# Patient Record
Sex: FEMALE | Race: WHITE | NOT HISPANIC OR LATINO | Employment: FULL TIME | URBAN - NONMETROPOLITAN AREA
[De-identification: names, ages, dates, MRNs, and addresses within clinical notes are randomized per-mention and may not be internally consistent; named-entity substitution may affect disease eponyms.]

---

## 2019-11-19 ENCOUNTER — OFFICE VISIT CONVERTED (OUTPATIENT)
Dept: FAMILY MEDICINE CLINIC | Age: 34
End: 2019-11-19
Attending: FAMILY MEDICINE

## 2020-11-13 ENCOUNTER — HOSPITAL ENCOUNTER (OUTPATIENT)
Dept: OTHER | Facility: HOSPITAL | Age: 35
Discharge: HOME | End: 2020-11-13
Attending: NURSE PRACTITIONER

## 2020-11-13 LAB
ALBUMIN SERPL-MCNC: 4.4 G/DL (ref 3.5–5)
ALBUMIN/GLOB SERPL: 1.8 {RATIO} (ref 1.4–2.6)
ALP SERPL-CCNC: 47 U/L (ref 42–98)
ALT SERPL-CCNC: 11 U/L (ref 10–40)
ANION GAP SERPL CALC-SCNC: 15 MMOL/L (ref 8–19)
AST SERPL-CCNC: 19 U/L (ref 15–50)
BASOPHILS # BLD MANUAL: 0.03 10*3/UL (ref 0–0.2)
BASOPHILS NFR BLD MANUAL: 0.4 % (ref 0–3)
BILIRUB SERPL-MCNC: 0.42 MG/DL (ref 0.2–1.3)
BUN SERPL-MCNC: 8 MG/DL (ref 5–25)
BUN/CREAT SERPL: 9 {RATIO} (ref 6–20)
CALCIUM SERPL-MCNC: 9.5 MG/DL (ref 8.7–10.4)
CHLORIDE SERPL-SCNC: 103 MMOL/L (ref 99–111)
CHOLEST SERPL-MCNC: 203 MG/DL (ref 107–200)
CHOLEST/HDLC SERPL: 4 {RATIO} (ref 3–6)
CONV CO2: 24 MMOL/L (ref 22–32)
CONV TOTAL PROTEIN: 6.8 G/DL (ref 6.3–8.2)
CREAT UR-MCNC: 0.91 MG/DL (ref 0.5–0.9)
DEPRECATED RDW RBC AUTO: 42.5 FL
EOSINOPHIL # BLD MANUAL: 0.12 10*3/UL (ref 0–0.7)
EOSINOPHIL NFR BLD MANUAL: 1.8 % (ref 0–7)
ERYTHROCYTE [DISTWIDTH] IN BLOOD BY AUTOMATED COUNT: 11.7 % (ref 11.5–14.5)
GFR SERPLBLD BASED ON 1.73 SQ M-ARVRAT: >60 ML/MIN/{1.73_M2}
GLOBULIN UR ELPH-MCNC: 2.4 G/DL (ref 2–3.5)
GLUCOSE SERPL-MCNC: 88 MG/DL (ref 65–99)
GRANS (ABSOLUTE): 3.77 10*3/UL (ref 2–8)
GRANS: 56.2 % (ref 30–85)
HBA1C MFR BLD: 14.2 G/DL (ref 12–16)
HCT VFR BLD AUTO: 41 % (ref 37–47)
HDLC SERPL-MCNC: 51 MG/DL (ref 40–60)
IMM GRANULOCYTES # BLD: 0.01 10*3/UL (ref 0–0.54)
IMM GRANULOCYTES NFR BLD: 0.1 % (ref 0–0.43)
LDLC SERPL CALC-MCNC: 116 MG/DL (ref 70–100)
LYMPHOCYTES # BLD MANUAL: 2.5 10*3/UL (ref 1–5)
LYMPHOCYTES NFR BLD MANUAL: 4.3 % (ref 3–10)
MCH RBC QN AUTO: 33.7 PG (ref 27–31)
MCHC RBC AUTO-ENTMCNC: 34.6 G/DL (ref 33–37)
MCV RBC AUTO: 97.4 FL (ref 81–99)
MONOCYTES # BLD AUTO: 0.29 10*3/UL (ref 0.2–1.2)
OSMOLALITY SERPL CALC.SUM OF ELEC: 284 MOSM/KG (ref 273–304)
PLATELET # BLD AUTO: 230 10*3/UL (ref 130–400)
PMV BLD AUTO: 10.1 FL (ref 7.4–10.4)
POTASSIUM SERPL-SCNC: 3.9 MMOL/L (ref 3.5–5.3)
RBC # BLD AUTO: 4.21 10*6/UL (ref 4.2–5.4)
SODIUM SERPL-SCNC: 138 MMOL/L (ref 135–147)
T4 FREE SERPL-MCNC: 0.9 NG/DL (ref 0.9–1.8)
TRIGL SERPL-MCNC: 181 MG/DL (ref 40–150)
TSH SERPL-ACNC: 5.06 M[IU]/L (ref 0.27–4.2)
VARIANT LYMPHS NFR BLD MANUAL: 37.2 % (ref 20–45)
VLDLC SERPL-MCNC: 36 MG/DL (ref 5–37)
WBC # BLD AUTO: 6.72 10*3/UL (ref 4.8–10.8)

## 2020-11-20 ENCOUNTER — OFFICE VISIT CONVERTED (OUTPATIENT)
Dept: FAMILY MEDICINE CLINIC | Age: 35
End: 2020-11-20
Attending: NURSE PRACTITIONER

## 2020-11-25 ENCOUNTER — HOSPITAL ENCOUNTER (OUTPATIENT)
Dept: OTHER | Facility: HOSPITAL | Age: 35
Discharge: HOME | End: 2020-11-25
Attending: NURSE PRACTITIONER

## 2020-11-25 ENCOUNTER — OFFICE VISIT CONVERTED (OUTPATIENT)
Dept: FAMILY MEDICINE CLINIC | Age: 35
End: 2020-11-25
Attending: NURSE PRACTITIONER

## 2020-11-27 LAB — BACTERIA UR CULT: NORMAL

## 2020-12-07 ENCOUNTER — HOSPITAL ENCOUNTER (OUTPATIENT)
Dept: OTHER | Facility: HOSPITAL | Age: 35
Discharge: HOME | End: 2020-12-07
Attending: NURSE PRACTITIONER

## 2020-12-29 ENCOUNTER — HOSPITAL ENCOUNTER (OUTPATIENT)
Dept: OTHER | Facility: HOSPITAL | Age: 35
Discharge: HOME | End: 2020-12-29
Attending: NURSE PRACTITIONER

## 2020-12-29 LAB
APPEARANCE UR: CLEAR
BACTERIA UR QL AUTO: ABNORMAL
BILIRUB UR QL: NEGATIVE
CASTS URNS QL MICRO: ABNORMAL /[LPF]
COLOR UR: YELLOW
CONV LEUKOCYTE ESTERASE: NEGATIVE
CONV UROBILINOGEN IN URINE BY AUTOMATED TEST STRIP: 0.2 {EHRLICHU}/DL (ref 0.1–1)
EPI CELLS #/AREA URNS HPF: ABNORMAL /[HPF]
GLUCOSE 24H UR-MCNC: NEGATIVE MG/DL
HGB UR QL STRIP: NEGATIVE
KETONES UR QL STRIP: NEGATIVE MG/DL
MUCOUS THREADS URNS QL MICRO: ABNORMAL
NITRITE UR-MCNC: NEGATIVE MG/ML
PH UR STRIP.AUTO: 7 [PH] (ref 5–8)
PROT UR-MCNC: NEGATIVE MG/DL
RBC # BLD AUTO: ABNORMAL /[HPF]
SP GR UR STRIP: 1.02 (ref 1–1.03)
SPECIMEN SOURCE: ABNORMAL
TSH SERPL-ACNC: 3 M[IU]/L (ref 0.27–4.2)
UNIDENT CRYS URNS QL MICRO: ABNORMAL /[HPF]
WBC #/AREA URNS HPF: ABNORMAL /[HPF]

## 2020-12-31 LAB — BACTERIA UR CULT: NORMAL

## 2021-01-06 ENCOUNTER — HOSPITAL ENCOUNTER (OUTPATIENT)
Dept: OTHER | Facility: HOSPITAL | Age: 36
Discharge: HOME | End: 2021-01-06
Attending: FAMILY MEDICINE

## 2021-01-07 LAB — SARS-COV-2 RNA SPEC QL NAA+PROBE: DETECTED

## 2021-05-18 NOTE — PROGRESS NOTES
Jolene Gonzales  1985     Office/Outpatient Visit    Visit Date: Tue, Nov 19, 2019 11:20 am    Provider: Ginette Solano MD (Assistant: Tatiana Davis MA)    Location: [facility]        Electronically signed by Ginette Solano MD on  11/19/2019 12:22:51 PM                             Subjective:        CC: Ms. Gonzales is a 34 year old female.  Patient presents today for new patient establishment;         HPI: Jolene is here today to establish care with me and for her annual physical.  She is quite healthy.        She is UTD on pap smear, last done 3/2019 and this was normal.  Five year repeat recommended.  No h/o abnormal pap smear.  She is UTD on Tdap (3 years ago with her last pregnancy), Havrix 1&2 (2018) and flu (11/2019).          She is on omeprazole for GERD.  No indigestion or reflux as long as she is on the medication.        She takes a PNV daily.        She has a h/o migraines for which she has been on Fioricet in the past.  She would like to get a refill on that or something similar.          PHQ-9 Depression Screening: Completed form scanned and in chart; Total Score 0     ROS:     CONSTITUTIONAL:  Negative for fatigue and fever.      EYES:  Negative for blurred vision.      E/N/T:  Negative for diminished hearing and nasal congestion.      CARDIOVASCULAR:  Negative for chest pain and palpitations.      RESPIRATORY:  Negative for recent cough and dyspnea.      GASTROINTESTINAL:  Negative for abdominal pain, constipation, diarrhea, nausea and vomiting.      GENITOURINARY:  Negative for dysuria and urinary incontinence.      MUSCULOSKELETAL:  Negative for arthralgias and myalgias.      INTEGUMENTARY/BREAST:  Positive for rash psoriasis (follows with Dr. Hua).   Negative for atypical mole(s).      NEUROLOGICAL:  Positive for headaches ( migraine ).   Negative for paresthesias or weakness.      PSYCHIATRIC:  Negative for anxiety, depression and sleep disturbance.          Past Medical  History / Family History / Social History:         Last Reviewed on 2019 12:22 PM by Ginette Solano    Past Medical History:                 PAST MEDICAL HISTORY         Gastroesophageal Reflux Disease     Migraine Headaches     Psoriasis         Surgical History:         : X 2;         Family History:         Sister(s): Endocrine Hypothyroidism     Maternal Grandfather: Myocardial Infarction (  age 55 )         Social History: sister-in-law Balaji Crockett     Occupation: [employer];     Marital Status:  Shahid     Children: 2 children         Tobacco/Alcohol/Supplements:     Last Reviewed on 2019 12:22 PM by Ginette Solano    Tobacco: She has never smoked.          Substance Abuse History:     Last Reviewed on 2019 12:22 PM by Ginette Solano        Mental Health History:     Last Reviewed on 2019 12:22 PM by Ginette Solano        Communicable Diseases (eg STDs):     Last Reviewed on 2019 12:22 PM by Ginette Solano        Current Problems:     None Recorded        Immunizations:     None        Current Medications:     None        Objective:        Vitals:         Current: 2019 11:33:25 AM    Ht:  5 ft, 6 in;  Wt: 195 lbs;  BMI: 31.5T: 98.3 F (oral);  BP: 121/87 mm Hg (left arm, sitting);  P: 86 bpm (left arm (BP Cuff), sitting)        Exams:     PHYSICAL EXAM:     GENERAL: vital signs recorded - well developed, well nourished;  well groomed;  no apparent distress;     EYES: extraocular movements intact; conjunctiva and cornea are normal; PERRLA;     E/N/T: OROPHARYNX:  normal mucosa, dentition, gingiva, and posterior pharynx;     NECK: range of motion is normal; thyroid exam reveals not enlarged;     RESPIRATORY: normal respiratory rate and pattern with no distress; normal breath sounds with no rales, rhonchi, wheezes or rubs;     CARDIOVASCULAR: normal rate; rhythm is regular;  no systolic murmur; no edema;     GASTROINTESTINAL: nontender; normal  bowel sounds;     LYMPHATIC: no enlargement of cervical or facial nodes;     MUSCULOSKELETAL: normal gait; normal overall tone     NEUROLOGIC: mental status: alert and oriented x 3; Reflexes: brachioradialis: 2+; knee jerks: 2+;     PSYCHIATRIC: appropriate affect and demeanor; normal psychomotor function; normal speech pattern;         Assessment:         Z00.00   Encounter for general adult medical examination without abnormal findings       K21.9   Gastro-esophageal reflux disease without esophagitis       Z13.31   Encounter for screening for depression       G43.009   Migraine without aura, not intractable, without status migrainosus       L40.0   Psoriasis vulgaris           ORDERS:         Meds Prescribed:       [New Rx] Imitrex 50 mg oral tablet [take 1 tablet (50 mg) by oral route after onset of migraine; may repeat after 2 hours if headache returns, not to exceed 2 in 24hrs], #27 (twenty seven) tablets, Refills: 1 (one)       [Refilled] Prenatal 28 mg iron- 800 mcg oral tablet [take one tablet by mouth daily], #90 (ninety) tablets, Refills: 3 (three)       [Refilled] omeprazole 40 mg oral capsule,delayed release (enteric coated) [take 1 capsule (40 mg) by oral route once daily before a meal], #90 (ninety) capsules, Refills: 3 (three)       [New Rx] triamcinolone acetonide 0.1 % Topical Cream [apply a thin layer to the affected area(s) by topical route 2 times per day], #30 (thirty) grams, Refills: 0 (zero)         Lab Orders:       APPTO  Appointment need  (In-House)              Other Orders:         Depression screen negative  (In-House)                      Plan:         Encounter for general adult medical examination without abnormal findingsShe is UTD on pap smear, last done 3/2019 and this was normal.  Five year repeat recommended.  No h/o abnormal pap smear.  She is UTD on Tdap (3 years ago with her last pregnancy), Havrix 1&2 (2018) and flu (11/2019).  RTC 1 yr.    MIPS PHQ-9 Depression  Screening: Completed form scanned and in chart; Total Score 0; Negative Depression Screen     FOLLOW-UP: in 1 year:.  annual physical 30 min with Russ          Orders:       APPTO  Appointment need  (In-House)              Depression screen negative  (In-House)              Gastro-esophageal reflux disease without esophagitisStable as long as she is on the medication.  We did discuss the possible risks of long-term use of the medication.          Prescriptions:       [Refilled] omeprazole 40 mg oral capsule,delayed release (enteric coated) [take 1 capsule (40 mg) by oral route once daily before a meal], #90 (ninety) capsules, Refills: 3 (three)         Migraine without aura, not intractable, without status migrainosusHas been on Fioricet in the past.  Will try her on Imitrex instead.          Prescriptions:       [New Rx] Imitrex 50 mg oral tablet [take 1 tablet (50 mg) by oral route after onset of migraine; may repeat after 2 hours if headache returns, not to exceed 2 in 24hrs], #27 (twenty seven) tablets, Refills: 1 (one)         Psoriasis vulgarisFollows with Dr. Hua but does not go back there until 2020.  Sending in some triamcinolone to use on her back.  She does sometimes get outbreaks on her face but knows not to use it there.          Prescriptions:       [New Rx] triamcinolone acetonide 0.1 % Topical Cream [apply a thin layer to the affected area(s) by topical route 2 times per day], #30 (thirty) grams, Refills: 0 (zero)             Other Prescriptions:       [Refilled] Prenatal 28 mg iron- 800 mcg oral tablet [take one tablet by mouth daily], #90 (ninety) tablets, Refills: 3 (three)         Patient Recommendations:        For  Encounter for general adult medical examination without abnormal findings:                    APPOINTMENT INFORMATION:        Monday Tuesday Wednesday Thursday Friday Saturday Sunday            Time:___________________AM  PM   Date:_____________________              Charge Capture:         Primary Diagnosis:     Z00.00  Encounter for general adult medical examination without abnormal findings           Orders:      [id number]  Preventive medicine, new patient, age 18-39 years  (In-House)            APPTO  Appointment need  (In-House)              Depression screen negative  (In-House)              K21.9  Gastro-esophageal reflux disease without esophagitis     Z13.31  Encounter for screening for depression     G43.009  Migraine without aura, not intractable, without status migrainosus     L40.0  Psoriasis vulgaris

## 2021-05-18 NOTE — PROGRESS NOTES
Jolene Gonzales  1985     Office/Outpatient Visit    Visit Date:  02:59 pm    Provider: Ave Harris N.P. (Assistant: Neida Sandoval MA)    Location: [facility]        Electronically signed by Ave Harris N.P. on  2020 03:25:12 PM                             Subjective:        CC: VIDEO [phone] DONE today Ms. Gonzales is a 35 year old White female.  Burning and sharp sensations while urinating. Started 2020;         HPI:           Ms. Gonzales presents with dysuria.  This began within the last 2 days.  Associated symptoms include urgency and urinary frequency.  She denies associated abdominal pain, fever, flank pain, hematuria or vomiting.  Treatments tried thus far include Increasing fluid intake.  Treatment effectivenss has been generally ineffective.      ROS:     CONSTITUTIONAL:  Negative for chills, fatigue and fever.      GASTROINTESTINAL:  Negative for abdominal pain, nausea and vomiting.      GENITOURINARY:  Positive for dysuria and frequent urination.   Negative for hematuria.      MUSCULOSKELETAL:  Negative for back pain.          Past Medical History / Family History / Social History:         Last Reviewed on 2020 09:28 AM by Ave Harris    Past Medical History:                 PAST MEDICAL HISTORY         Gastroesophageal Reflux Disease     Migraine Headaches     Psoriasis         PAST MEDICAL HISTORY             CURRENT MEDICAL PROVIDERS:    Dermatologist: [facility]         PREVENTIVE HEALTH MAINTENANCE             DENTAL CLEANING: was last done  - [place]     EYE EXAM: was last done      PAP SMEAR: was last done  with normal results         Surgical History:         : X 2;         Family History:     Father: Vitamin D deficiency     Mother: Hypertension;  Hypothyroidism     Sister(s): Endocrine Hypothyroidism     Maternal Grandfather: Myocardial Infarction (  age 55 )          Social History: sister-in-law Balaji Crockett     Occupation: [employer];     Marital Status:  Shahid     Children: 2 children         Tobacco/Alcohol/Supplements:     Last Reviewed on 11/25/2020 03:01 PM by Neida Sandoval    Tobacco: She has never smoked.          Alcohol: Frequency: Socially     Caffeine:  She admits to consuming caffeine via coffee ( 1 serving per day ).          Substance Abuse History:     Last Reviewed on 11/20/2020 09:28 AM by Ave Harris    None         Mental Health History:     Last Reviewed on 11/20/2020 09:28 AM by Ave Harris    NEGATIVE         Communicable Diseases (eg STDs):     Last Reviewed on 11/20/2020 09:28 AM by Ave Harris    Reportable health conditions; NEGATIVE         Current Problems:     Last Reviewed on 11/20/2020 09:28 AM by Ave Harris    Migraine without aura, not intractable, without status migrainosus    Gastro-esophageal reflux disease without esophagitis    Psoriasis vulgaris    Encounter for general adult medical examination without abnormal findings    Encounter for screening for depression    Encounter for immunization    Abnormal results of thyroid function studies    Dysuria        Immunizations:     influenza, injectable, quadrivalent, preservative free 11/19/2019    influenza, injectable, quadrivalent, preservative free (FLUZONE QUAD 3912-6301) 10/7/2020        Allergies:     Last Reviewed on 11/25/2020 03:01 PM by Neida Sandoval    codeine: Rash         Current Medications:     Last Reviewed on 11/20/2020 09:28 AM by Ave Harris    triamcinolone acetonide 0.1 % Topical Cream [apply a thin layer to the affected area(s) by topical route 2 times per day]    SUMAtriptan succinate 50 mg oral tablet [TAKE 1 TABLET AFTER ONSET OF MIGRAINE; MAY REPEAT AFTER 2 HOURS IF HEADACHE RETURNS, NOT TO EXCEED 2  TABLETS IN 24 HOURS]    Prenatal 28 mg iron- 800 mcg oral tablet [TAKE 1 TABLET EVERY DAY]    omeprazole 40 mg  oral capsule,delayed release (enteric coated) [TAKE 1 CAPSULE (40 MG) ONCE DAILY BEFORE A MEAL]    clobetasoL 0.05 % scalp Solution    levothyroxine 25 mcg oral tablet [take 1 tablet (25 mcg) by oral route once daily]        Objective:        Exams:     PHYSICAL EXAM:     GENERAL:  well developed and nourished; appropriately groomed; in no apparent distress;     EYES: extraocular movements intact; conjunctiva and cornea are normal;     RESPIRATORY: normal respiratory rate and pattern with no distress;     MUSCULOSKELETAL: normal overall tone     NEUROLOGIC: mental status: alert and oriented x 3;     PSYCHIATRIC: appropriate affect and demeanor; normal psychomotor function; normal speech pattern;         Lab/Test Results:         Glucose, Urine: Neg (11/25/2020),     Bilirubin, urine: Negative (11/25/2020),     Ketones, Urine Strip: Negative (11/25/2020),     Specific Gravity, urine: greater than 1.030 (11/25/2020),     Blood in Urine: moderate (11/25/2020),     pH, urine: 6.5 (11/25/2020),     Protein Urine QL: negative (11/25/2020),     Urobilinogen, urine: 0.2 E.U./dL (11/25/2020),     Nitrite, Urine: Negative (11/25/2020),     Leukoctyes, urine: Moderate (11/25/2020),     Appearance: Clear (11/25/2020),     collection source: Clean-catch (11/25/2020),     Color: Yellow (11/25/2020),     Performed by:: ans (11/25/2020),             Assessment:         N39.0   Urinary tract infection, site not specified           ORDERS:         Meds Prescribed:       [New Rx] Macrobid 100 mg oral capsule [take 1 capsule (100 mg) by oral route 2 times per day with food x 7 days], #14 (fourteen) capsules, Refills: 0 (zero)         Lab Orders:       [id number]  [facility] - [facility] Urine Culture  (Send-Out)            [id number]  Urinalysis, automated, without microscopy  (In-House)                      Plan:         Urinary tract infection, site not specifiedurine was discarded prior to sending for culture  If she continues with symptoms, she will need  to come in for a follow up urine with culture at that time - order kept in chart for future need    Telehealth: Verbal consent obtained for visit to occur via televideo conferencing; Staff, other than provider, present during telephone visit include only patient and provider           Prescriptions:       [New Rx] Macrobid 100 mg oral capsule [take 1 capsule (100 mg) by oral route 2 times per day with food x 7 days], #14 (fourteen) capsules, Refills: 0 (zero)           Orders:       [id number]  Urinalysis, automated, without microscopy  (In-House)            [id number]  [facility] Urine Culture  (Send-Out)                  Charge Capture:         Primary Diagnosis:     N39.0  Urinary tract infection, site not specified           Orders:      [id number]  Office/outpatient visit; established patient, level 3  (In-House)            [id number]  Urinalysis, automated, without microscopy  (In-House)

## 2021-05-18 NOTE — PROGRESS NOTES
Jolene Gonzales 1985     Preventive Medicine Services    Visit Date: Fri, Nov 20, 2020 9:16 am    Provider: Ave Harris N.P. (Assistant: Kacie Peralta LPN )    Location:         Electronically signed by Ave Harris N.P. on  11/20/2020 12:11:06 PM                             Subjective:        CC:     HPI: October 13  1 week late  negative pregnancy             Encounter for general adult medical examination without abnormal findings noted.  Her last physical exam was 1 year ago.  Her last menstrual period was 10/13/2020.  She is not currently using any form of contraception.  She performs breast self-exams occasionally.   Her last Pap smear was in 03/2018 and was normal.   Preventative Health updated today.  Ms. Gonzales denies any history of abnormal Pap smears.  Tobacco: She has never smoked.            PHQ-9 Depression Screening: Completed form scanned and in chart; Total Score 3           Complaint of migraine without aura, not intractable, without status migrainosus..  taking Imitrex PRN  has taken fioricet in the past but was changed last year HA for about a month   - Takes Imitrex around periods          Complaint of gastro-esophageal reflux disease without esophagitis..  taking Omeprazole  works well as long as she takes this  - does not take every day     Never had EGD in the past           Complaint of psoriasis vulgaris..  using triamcinolone PRN for areas of flare up           Complaint of abnormal results of thyroid function studies..  Recent blood work shows slight elevation in TSH  5.03  Family history of thyroid disorder     ROS:     CONSTITUTIONAL:  Positive for fatigue and unintentional weight gain.   Negative for fever or unintentional weight loss.      EYES:  Negative for blurred vision.      E/N/T:  Negative for ear pain, nasal congestion and sinus pressure.      CARDIOVASCULAR:  Negative for chest pain and pedal edema.      RESPIRATORY:  Negative for recent cough and dyspnea.       GASTROINTESTINAL:  Positive for acid reflux symptoms.   Negative for abdominal pain, constipation, diarrhea, heartburn, nausea or vomiting.      GENITOURINARY:  Negative for dysuria, change in urine stream and frequent urination.      MUSCULOSKELETAL:  Negative for arthralgias, back pain and myalgias.      INTEGUMENTARY/BREAST:  Positive for psoriasis.   Negative for pruritis or rash.      NEUROLOGICAL:  Positive for headaches ( migraine ).   Negative for dizziness, fainting or paresthesias.      ENDOCRINE:  Positive for hair loss and temperature intolerances ( heat intolerance ).   Negative for polydipsia or polyphagia.      ALLERGIC/IMMUNOLOGIC:  Negative for seasonal allergies and perennial allergies.      PSYCHIATRIC:  Positive for feelings of stress and insomnia.   Negative for anxiety, crying spells, depression, mood swings or suicidal thoughts.          Past Medical History / Family History / Social History:         Last Reviewed on 2020 09:28 AM by Ave Harris    Past Medical History:                 PAST MEDICAL HISTORY         Gastroesophageal Reflux Disease     Migraine Headaches     Psoriasis         PAST MEDICAL HISTORY             CURRENT MEDICAL PROVIDERS:    Dermatologist: [facility]         PREVENTIVE HEALTH MAINTENANCE             DENTAL CLEANING: was last done  - [place]     EYE EXAM: was last done      PAP SMEAR: was last done  with normal results         Surgical History:         : X 2;         Family History:     Father: Vitamin D deficiency     Mother: Hypertension;  Hypothyroidism     Sister(s): Endocrine Hypothyroidism     Maternal Grandfather: Myocardial Infarction (  age 55 )         Social History: sister-in-law Balaji Crockett     Occupation: [employer];     Marital Status:  Shahid     Children: 2 children         Tobacco/Alcohol/Supplements:     Last Reviewed on 2020 09:28 AM by Ave Harris    Tobacco: She  has never smoked.          Alcohol: Frequency: Socially     Caffeine:  She admits to consuming caffeine via coffee ( 1 serving per day ).          Substance Abuse History:     Last Reviewed on 11/20/2020 09:28 AM by Ave Harris    None         Mental Health History:     Last Reviewed on 11/20/2020 09:28 AM by Ave Harris    NEGATIVE         Communicable Diseases (eg STDs):     Last Reviewed on 11/20/2020 09:28 AM by Ave Harris    Reportable health conditions; NEGATIVE         Current Problems:     Last Reviewed on 11/20/2020 09:28 AM by Ave Harris    Migraine without aura, not intractable, without status migrainosus    Gastro-esophageal reflux disease without esophagitis    Psoriasis vulgaris    Encounter for general adult medical examination without abnormal findings    Encounter for screening for depression    Encounter for immunization    Abnormal results of thyroid function studies        Immunizations:     influenza, injectable, quadrivalent, preservative free 11/19/2019    influenza, injectable, quadrivalent, preservative free (FLUZONE QUAD [id number]) 10/7/2020        Allergies:     Last Reviewed on 11/20/2020 09:28 AM by Ave Harris    codeine: Rash         Current Medications:     Last Reviewed on 11/20/2020 09:28 AM by Ave Harris    triamcinolone acetonide 0.1 % Topical Cream [apply a thin layer to the affected area(s) by topical route 2 times per day]    SUMAtriptan succinate 50 mg oral tablet [TAKE 1 TABLET AFTER ONSET OF MIGRAINE; MAY REPEAT AFTER 2 HOURS IF HEADACHE RETURNS, NOT TO EXCEED 2  TABLETS IN 24 HOURS]    Prenatal 28 mg iron- 800 mcg oral tablet [TAKE 1 TABLET EVERY DAY]    omeprazole 40 mg oral capsule,delayed release (enteric coated) [TAKE 1 CAPSULE (40 MG) ONCE DAILY BEFORE A MEAL]    clobetasoL 0.05 % scalp Solution        Objective:        Vitals:         Current: 11/20/2020 9:24:44 AM    Ht:  5 ft, 6 in;  Wt: 199.2 lbs;  BMI: 32.2T: 97.4 F  (temporal);  BP: 141/92 mm Hg (right arm, sitting);  P: 76 bpm (right arm (BP Cuff), sitting);  sCr: 0.91 mg/dL;  GFR: 98.17        Repeat:     9:50:44 AM  BP:   142/82mm Hg (right arm, sitting)     Exams:     PHYSICAL EXAM:     GENERAL: vital signs recorded - well developed, well nourished;  no apparent distress;     E/N/T: EARS: external auditory canal normal and draining bilaterally;  the left TM is has fluid behind it and bilateral TMs are normal;  NOSE:  normal nasal mucosa, septum, turbinates, and sinuses; OROPHARYNX:  normal mucosa, dentition, gingiva, and posterior pharynx;     NECK: range of motion is normal; thyroid exam reveals diffuse enlargement;     RESPIRATORY: normal appearance and symmetric expansion of chest wall; normal respiratory rate and pattern with no distress; normal breath sounds with no rales, rhonchi, wheezes or rubs;     CARDIOVASCULAR: normal rate; rhythm is regular;  no edema;     GASTROINTESTINAL: nontender; normal bowel sounds; no organomegaly;     LYMPHATIC: no enlargement of cervical or facial nodes; no supraclavicular nodes;     MUSCULOSKELETAL: normal gait; normal range of motion of all major muscle groups; no limb or joint pain with range of motion;     NEUROLOGIC: mental status: alert and oriented x 3;     PSYCHIATRIC: appropriate affect and demeanor; normal speech pattern; normal thought and perception;         Assessment:         Z00.00   Encounter for general adult medical examination without abnormal findings       Z13.31   Encounter for screening for depression       G43.009   Migraine without aura, not intractable, without status migrainosus       K21.9   Gastro-esophageal reflux disease without esophagitis       L40.0   Psoriasis vulgaris       R94.6   Abnormal results of thyroid function studies           ORDERS:         Meds Prescribed:       [New Rx] levothyroxine 25 mcg oral tablet [take 1 tablet (25 mcg) by oral route once daily], #30 (thirty) tablets, Refills: 1  (one)         Lab Orders:       FUTURE  Future order to be done at patients convenience  (Send-Out)            38727  TSH - [facility] TSH  (Send-Out)              Other Orders:         Depression screen negative  (In-House)                      Plan:         Encounter for general adult medical examination without abnormal findingsDiscussed that she may be eligible for baseline screening Mammogram since she is 35 - she will check with her insurance.  Otherwise, she is up to date on her screenings     MIPS Negative Depression Screen           Orders:         Depression screen negative  (In-House)              Encounter for screening for depressionnegative        Migraine without aura, not intractable, without status migrainosusShe will call for refills when needed        Gastro-esophageal reflux disease without esophagitis        RECOMMENDATIONS given include: discussed need to try alternative  Suggested if continues, consider EGD for evaluation of cause - recommended conservative treatment - avoidance of foods, no food before bedtime.          Psoriasis vulgariscontinue follow up with Derm as recommended        Abnormal results of thyroid function studiesgiven family history, and symptoms, I have started her on low dose of medication.  I did discuss that her levels are low, but she would prefer to start medication at this time        FOLLOW-UP TESTING #1: FOLLOW-UP LABORATORY:  Labs to be scheduled in the future include TSH.   Patient to schedule to be performed in 6 weeks.            Prescriptions:       [New Rx] levothyroxine 25 mcg oral tablet [take 1 tablet (25 mcg) by oral route once daily], #30 (thirty) tablets, Refills: 1 (one)           Orders:       FUTURE  Future order to be done at patients convenience  (Send-Out)            54750  TSH - [facility] TSH  (Send-Out)                  Patient Recommendations:        For  Gastro-esophageal reflux disease without esophagitis:    I also recommend discussed need to  try alternative  Suggested if continues, consider EGD for evaluation of cause - recommended conservative treatment - avoidance of foods, no food before bedtime.          For  Abnormal results of thyroid function studies:            The following laboratory testing has been ordered: TSH Schedule the above testing in 6 weeks.              Charge Capture:         Primary Diagnosis:     Z00.00  Encounter for general adult medical examination without abnormal findings           Orders:      [id number]  Preventive medicine, established patient, age 18-39 years  (In-House)              Depression screen negative  (In-House)              Z13.31  Encounter for screening for depression     G43.009  Migraine without aura, not intractable, without status migrainosus     K21.9  Gastro-esophageal reflux disease without esophagitis     L40.0  Psoriasis vulgaris     R94.6  Abnormal results of thyroid function studies         ADDENDUMS:      ____________________________________    Addendum: 12/01/2020 06:29 JOSEPH - Ave Harris        CC:  Wellness exam

## 2021-06-16 RX ORDER — TRIAMCINOLONE ACETONIDE 1 MG/G
CREAM TOPICAL 2 TIMES DAILY
COMMUNITY

## 2021-06-16 RX ORDER — SUMATRIPTAN 50 MG/1
TABLET, FILM COATED ORAL
COMMUNITY
Start: 2021-05-09 | End: 2021-07-21

## 2021-06-16 RX ORDER — LEVOTHYROXINE SODIUM 0.03 MG/1
TABLET ORAL
COMMUNITY
Start: 2021-06-01 | End: 2021-08-04 | Stop reason: SDUPTHER

## 2021-06-16 RX ORDER — PNV NO.95/FERROUS FUM/FOLIC AC 28MG-0.8MG
TABLET ORAL
COMMUNITY
Start: 2021-04-03 | End: 2021-06-17

## 2021-06-16 RX ORDER — OMEPRAZOLE 40 MG/1
CAPSULE, DELAYED RELEASE ORAL
COMMUNITY
Start: 2021-04-03 | End: 2021-06-17

## 2021-06-16 RX ORDER — CLOBETASOL PROPIONATE 0.05 G/100ML
SHAMPOO TOPICAL 2 TIMES DAILY
COMMUNITY
End: 2023-02-10

## 2021-06-17 RX ORDER — OMEPRAZOLE 40 MG/1
CAPSULE, DELAYED RELEASE ORAL
Qty: 30 CAPSULE | Refills: 0 | Status: SHIPPED | OUTPATIENT
Start: 2021-06-17 | End: 2021-07-08

## 2021-06-17 RX ORDER — PNV NO.95/FERROUS FUM/FOLIC AC 28MG-0.8MG
TABLET ORAL
Qty: 30 TABLET | Refills: 1 | Status: SHIPPED | OUTPATIENT
Start: 2021-06-17 | End: 2021-08-04 | Stop reason: SDUPTHER

## 2021-07-01 VITALS
HEART RATE: 86 BPM | HEIGHT: 66 IN | BODY MASS INDEX: 31.34 KG/M2 | TEMPERATURE: 98.3 F | SYSTOLIC BLOOD PRESSURE: 121 MMHG | DIASTOLIC BLOOD PRESSURE: 87 MMHG | WEIGHT: 195 LBS

## 2021-07-02 VITALS
HEART RATE: 76 BPM | TEMPERATURE: 97.4 F | DIASTOLIC BLOOD PRESSURE: 82 MMHG | WEIGHT: 199.2 LBS | BODY MASS INDEX: 32.02 KG/M2 | HEIGHT: 66 IN | SYSTOLIC BLOOD PRESSURE: 142 MMHG

## 2021-07-08 RX ORDER — OMEPRAZOLE 40 MG/1
CAPSULE, DELAYED RELEASE ORAL
Qty: 30 CAPSULE | Refills: 0 | Status: SHIPPED | OUTPATIENT
Start: 2021-07-08 | End: 2021-07-09 | Stop reason: SDUPTHER

## 2021-07-09 RX ORDER — OMEPRAZOLE 40 MG/1
40 CAPSULE, DELAYED RELEASE ORAL DAILY
Qty: 90 CAPSULE | Refills: 3 | Status: SHIPPED | OUTPATIENT
Start: 2021-07-09 | End: 2022-05-02

## 2021-07-21 RX ORDER — SUMATRIPTAN 50 MG/1
TABLET, FILM COATED ORAL
Qty: 27 TABLET | Refills: 1 | Status: SHIPPED | OUTPATIENT
Start: 2021-07-21 | End: 2021-12-16

## 2021-07-30 ENCOUNTER — LAB (OUTPATIENT)
Dept: LAB | Facility: HOSPITAL | Age: 36
End: 2021-07-30

## 2021-07-30 DIAGNOSIS — Z00.00 ANNUAL PHYSICAL EXAM: ICD-10-CM

## 2021-07-30 DIAGNOSIS — Z13.6 ENCOUNTER FOR SCREENING FOR CARDIOVASCULAR DISORDERS: ICD-10-CM

## 2021-07-30 DIAGNOSIS — E03.9 ACQUIRED HYPOTHYROIDISM: Primary | ICD-10-CM

## 2021-07-30 DIAGNOSIS — E03.9 ACQUIRED HYPOTHYROIDISM: ICD-10-CM

## 2021-07-30 LAB
ALBUMIN SERPL-MCNC: 4.5 G/DL (ref 3.5–5.2)
ALBUMIN/GLOB SERPL: 2 G/DL
ALP SERPL-CCNC: 49 U/L (ref 39–117)
ALT SERPL W P-5'-P-CCNC: 11 U/L (ref 1–33)
ANION GAP SERPL CALCULATED.3IONS-SCNC: 11 MMOL/L (ref 5–15)
AST SERPL-CCNC: 17 U/L (ref 1–32)
BILIRUB SERPL-MCNC: 0.4 MG/DL (ref 0–1.2)
BUN SERPL-MCNC: 10 MG/DL (ref 6–20)
BUN/CREAT SERPL: 11.8 (ref 7–25)
CALCIUM SPEC-SCNC: 9.2 MG/DL (ref 8.6–10.5)
CHLORIDE SERPL-SCNC: 104 MMOL/L (ref 98–107)
CHOLEST SERPL-MCNC: 210 MG/DL (ref 0–200)
CO2 SERPL-SCNC: 24 MMOL/L (ref 22–29)
CREAT SERPL-MCNC: 0.85 MG/DL (ref 0.57–1)
GFR SERPL CREATININE-BSD FRML MDRD: 76 ML/MIN/1.73
GLOBULIN UR ELPH-MCNC: 2.3 GM/DL
GLUCOSE SERPL-MCNC: 95 MG/DL (ref 65–99)
HDLC SERPL-MCNC: 53 MG/DL (ref 40–60)
HOLD SPECIMEN: NORMAL
LDLC SERPL CALC-MCNC: 116 MG/DL (ref 0–100)
LDLC/HDLC SERPL: 2.06 {RATIO}
POTASSIUM SERPL-SCNC: 4.2 MMOL/L (ref 3.5–5.2)
PROT SERPL-MCNC: 6.8 G/DL (ref 6–8.5)
SODIUM SERPL-SCNC: 139 MMOL/L (ref 136–145)
TRIGL SERPL-MCNC: 238 MG/DL (ref 0–150)
TSH SERPL DL<=0.05 MIU/L-ACNC: 2.74 UIU/ML (ref 0.27–4.2)
VLDLC SERPL-MCNC: 41 MG/DL (ref 5–40)

## 2021-07-30 PROCEDURE — 80053 COMPREHEN METABOLIC PANEL: CPT

## 2021-07-30 PROCEDURE — 84443 ASSAY THYROID STIM HORMONE: CPT

## 2021-07-30 PROCEDURE — 36415 COLL VENOUS BLD VENIPUNCTURE: CPT

## 2021-07-30 PROCEDURE — 80061 LIPID PANEL: CPT

## 2021-08-04 ENCOUNTER — OFFICE VISIT (OUTPATIENT)
Dept: FAMILY MEDICINE CLINIC | Age: 36
End: 2021-08-04

## 2021-08-04 VITALS
BODY MASS INDEX: 31.43 KG/M2 | HEART RATE: 76 BPM | SYSTOLIC BLOOD PRESSURE: 134 MMHG | HEIGHT: 66 IN | WEIGHT: 195.6 LBS | DIASTOLIC BLOOD PRESSURE: 73 MMHG | TEMPERATURE: 98.2 F

## 2021-08-04 DIAGNOSIS — K21.9 GASTROESOPHAGEAL REFLUX DISEASE WITHOUT ESOPHAGITIS: ICD-10-CM

## 2021-08-04 DIAGNOSIS — E03.9 ACQUIRED HYPOTHYROIDISM: Primary | ICD-10-CM

## 2021-08-04 DIAGNOSIS — G43.009 MIGRAINE WITHOUT AURA AND WITHOUT STATUS MIGRAINOSUS, NOT INTRACTABLE: ICD-10-CM

## 2021-08-04 PROBLEM — L40.0 PSORIASIS VULGARIS: Status: ACTIVE | Noted: 2021-08-04

## 2021-08-04 PROCEDURE — 99213 OFFICE O/P EST LOW 20 MIN: CPT | Performed by: FAMILY MEDICINE

## 2021-08-04 RX ORDER — PNV NO.95/FERROUS FUM/FOLIC AC 28MG-0.8MG
1 TABLET ORAL DAILY
Qty: 90 TABLET | Refills: 3 | Status: SHIPPED | OUTPATIENT
Start: 2021-08-04 | End: 2022-05-25

## 2021-08-04 RX ORDER — LEVOTHYROXINE SODIUM 0.03 MG/1
25 TABLET ORAL DAILY
Qty: 90 TABLET | Refills: 1 | Status: SHIPPED | OUTPATIENT
Start: 2021-08-04 | End: 2022-01-03

## 2021-08-04 NOTE — ASSESSMENT & PLAN NOTE
Asymptomatic.  Stable on levothyroxine 25 mcg daily.  No changes to medications.  Refill sent.  She just had labs done last week and TSH was WNL.  RTC 6 months for annual physical.

## 2021-08-04 NOTE — PROGRESS NOTES
"Chief Complaint  Hypothyroidism (follow up visit )    Subjective          Jolene Gonzales presents to [facility] today for routine f/u of chronic issues.    She is on levothyroxine for hypothyroidism.  She denies heat or cold intolerance, changes in hair or skin.  TSH done last week was within normal limits.    She is on omeprazole for GERD.  She denies indigestion or reflux.    She is on Imitrex for abortive therapy for migraines.  This is working well to control her headaches.  She has headaches once every 2 months or so.      Current Outpatient Medications:   •  clobetasol propionate (CLOBEX) 0.05 % shampoo, Apply  topically to the appropriate area as directed 2 (Two) Times a Day., Disp: , Rfl:   •  levothyroxine (SYNTHROID, LEVOTHROID) 25 MCG tablet, Take 1 tablet by mouth Daily., Disp: 90 tablet, Rfl: 1  •  omeprazole (priLOSEC) 40 MG capsule, Take 1 capsule by mouth Daily., Disp: 90 capsule, Rfl: 3  •  Prenatal Vit-Fe Fumarate-FA (prenatal vitamin 28-0.8) 28-0.8 MG tablet tablet, Take 1 tablet by mouth Daily., Disp: 90 tablet, Rfl: 3  •  SUMAtriptan (IMITREX) 50 MG tablet, TAKE 1 TABLET AFTER ONSET OF MIGRAINE; MAY REPEAT AFTER 2 HOURS IF HEADACHE RETURNS, NOT TO EXCEED 2  TABLETS IN 24 HOURS, Disp: 27 tablet, Rfl: 1  •  triamcinolone (KENALOG) 0.1 % cream, Apply  topically to the appropriate area as directed 2 (Two) Times a Day., Disp: , Rfl:     Allergies:  Codeine      Objective   Vital Signs:   /73 (BP Location: Left arm, Patient Position: Sitting)   Pulse 76   Temp 98.2 °F (36.8 °C) (Oral)   Ht 167.6 cm (66\")   Wt 88.7 kg (195 lb 9.6 oz)   BMI 31.57 kg/m²     Physical Exam  Vitals reviewed.   Constitutional:       General: She is not in acute distress.     Appearance: Normal appearance. She is well-developed.   HENT:      Head: Normocephalic and atraumatic.      Right Ear: External ear normal.      Left Ear: External ear normal.      Nose: Nose normal.      " Mouth/Throat:      Mouth: Mucous membranes are moist.   Eyes:      Extraocular Movements: Extraocular movements intact.      Conjunctiva/sclera: Conjunctivae normal.      Pupils: Pupils are equal, round, and reactive to light.   Cardiovascular:      Rate and Rhythm: Normal rate and regular rhythm.      Heart sounds: No murmur heard.     Pulmonary:      Effort: Pulmonary effort is normal.      Breath sounds: Normal breath sounds. No wheezing, rhonchi or rales.   Abdominal:      General: Bowel sounds are normal. There is no distension.      Palpations: Abdomen is soft.      Tenderness: There is no abdominal tenderness.   Musculoskeletal:         General: Normal range of motion.   Neurological:      Mental Status: She is alert.   Psychiatric:         Mood and Affect: Affect normal.             Assessment and Plan    Diagnoses and all orders for this visit:    1. Acquired hypothyroidism (Primary)  Assessment & Plan:  Asymptomatic.  Stable on levothyroxine 25 mcg daily.  No changes to medications.  Refill sent.  She just had labs done last week and TSH was WNL.  RTC 6 months for annual physical.    Orders:  -     levothyroxine (SYNTHROID, LEVOTHROID) 25 MCG tablet; Take 1 tablet by mouth Daily.  Dispense: 90 tablet; Refill: 1    2. Gastroesophageal reflux disease without esophagitis  Assessment & Plan:  Stable.  No refills needed.        3. Migraine without aura and without status migrainosus, not intractable  Assessment & Plan:  Stable.  No refills needed.  Headaches are doing very well, once every 2 months or so.        Other orders  -     Prenatal Vit-Fe Fumarate-FA (prenatal vitamin 28-0.8) 28-0.8 MG tablet tablet; Take 1 tablet by mouth Daily.  Dispense: 90 tablet; Refill: 3      Follow Up   Return in about 6 months (around 2/4/2022) for Annual physical.  Patient was given instructions and counseling regarding her condition or for health maintenance advice. Please see specific information pulled into the AVS if  appropriate.

## 2021-08-09 RX ORDER — PNV NO.95/FERROUS FUM/FOLIC AC 28MG-0.8MG
TABLET ORAL
Qty: 60 TABLET | OUTPATIENT
Start: 2021-08-09

## 2021-12-16 RX ORDER — SUMATRIPTAN 50 MG/1
TABLET, FILM COATED ORAL
Qty: 27 TABLET | Refills: 1 | Status: SHIPPED | OUTPATIENT
Start: 2021-12-16 | End: 2022-05-12

## 2022-01-01 DIAGNOSIS — E03.9 ACQUIRED HYPOTHYROIDISM: ICD-10-CM

## 2022-01-03 RX ORDER — LEVOTHYROXINE SODIUM 0.03 MG/1
TABLET ORAL
Qty: 90 TABLET | Refills: 1 | Status: SHIPPED | OUTPATIENT
Start: 2022-01-03 | End: 2022-05-31

## 2022-02-09 ENCOUNTER — OFFICE VISIT (OUTPATIENT)
Dept: FAMILY MEDICINE CLINIC | Age: 37
End: 2022-02-09

## 2022-02-09 VITALS
TEMPERATURE: 98.7 F | DIASTOLIC BLOOD PRESSURE: 64 MMHG | HEART RATE: 64 BPM | SYSTOLIC BLOOD PRESSURE: 113 MMHG | WEIGHT: 196.6 LBS | BODY MASS INDEX: 31.73 KG/M2

## 2022-02-09 DIAGNOSIS — E03.9 ACQUIRED HYPOTHYROIDISM: ICD-10-CM

## 2022-02-09 DIAGNOSIS — K21.9 GASTROESOPHAGEAL REFLUX DISEASE WITHOUT ESOPHAGITIS: ICD-10-CM

## 2022-02-09 DIAGNOSIS — G43.009 MIGRAINE WITHOUT AURA AND WITHOUT STATUS MIGRAINOSUS, NOT INTRACTABLE: ICD-10-CM

## 2022-02-09 DIAGNOSIS — Z11.59 ENCOUNTER FOR SCREENING FOR OTHER VIRAL DISEASES: ICD-10-CM

## 2022-02-09 DIAGNOSIS — Z00.00 ANNUAL PHYSICAL EXAM: Primary | ICD-10-CM

## 2022-02-09 PROBLEM — Z86.16 HISTORY OF COVID-19: Status: ACTIVE | Noted: 2022-02-09

## 2022-02-09 PROCEDURE — 99395 PREV VISIT EST AGE 18-39: CPT | Performed by: FAMILY MEDICINE

## 2022-02-09 PROCEDURE — 99214 OFFICE O/P EST MOD 30 MIN: CPT | Performed by: FAMILY MEDICINE

## 2022-02-09 NOTE — PROGRESS NOTES
Chief Complaint  Annual Exam    Subjective          Jolene Gonzales presents to [facility] today for her annual physical.  She is reportedly UTD on pap smear, last done 3/2019 and this was normal.  She is planning to get in with Dr. Brooks ([facility]) for her repeat.  No h/o abnormal pap smear.  She is UTD on COVID (Pfizer x2, Moderna booster),Tdap (5 years ago with her last pregnancy), and flu (10/2021).  She is due for routine labs.    She has noticed rare palpitations ever sine her COVID infection Jan 2021.  They are very sporadic and unpredictable.  No associated CP or SOB, no lightheadedness or syncope.    She is on levothyroxine for hypothyroidism.  No cold/heat intolerance or changes in hair or skin.     She is on Prilosec for GERD.  No heartburn or indigestion.    She is on Imitrex for abortive therapy for migraines.  Headaches are well controlled, occurring every 2 months or so.    Review of Systems   Constitutional: Negative for chills, fatigue and fever.   HENT: Negative for congestion, hearing loss and rhinorrhea.    Eyes: Negative for pain and visual disturbance.   Respiratory: Negative for cough and shortness of breath.    Cardiovascular: Positive for palpitations. Negative for chest pain.   Gastrointestinal: Negative for abdominal pain, constipation, diarrhea, nausea and vomiting.   Genitourinary: Negative for dysuria and hematuria.   Musculoskeletal: Negative for arthralgias and myalgias.   Skin: Negative for rash.   Neurological: Negative for weakness and numbness.   Psychiatric/Behavioral: Negative for dysphoric mood and sleep disturbance. The patient is not nervous/anxious.          Current Outpatient Medications:   •  clobetasol propionate (CLOBEX) 0.05 % shampoo, Apply  topically to the appropriate area as directed 2 (Two) Times a Day., Disp: , Rfl:   •  levothyroxine (SYNTHROID, LEVOTHROID) 25 MCG tablet, TAKE 1 TABLET EVERY DAY, Disp: 90 tablet, Rfl: 1  •  omeprazole  (priLOSEC) 40 MG capsule, Take 1 capsule by mouth Daily., Disp: 90 capsule, Rfl: 3  •  Prenatal Vit-Fe Fumarate-FA (prenatal vitamin 28-0.8) 28-0.8 MG tablet tablet, Take 1 tablet by mouth Daily., Disp: 90 tablet, Rfl: 3  •  SUMAtriptan (IMITREX) 50 MG tablet, TAKE 1 TABLET AFTER ONSET OF MIGRAINE; MAY REPEAT AFTER 2 HOURS IF HEADACHE RETURNS, NOT TO EXCEED 2  TABLETS IN 24 HOURS, Disp: 27 tablet, Rfl: 1  •  triamcinolone (KENALOG) 0.1 % cream, Apply  topically to the appropriate area as directed 2 (Two) Times a Day., Disp: , Rfl:     Allergies:  Codeine      Objective   Vital Signs:   Vitals:    02/09/22 0822   BP: 113/64   Pulse: 64   Temp: 98.7 °F (37.1 °C)   TempSrc: Oral   Weight: 89.2 kg (196 lb 9.6 oz)     Physical Exam  Vitals reviewed.   Constitutional:       General: She is not in acute distress.     Appearance: Normal appearance. She is well-developed.   HENT:      Head: Normocephalic and atraumatic.      Right Ear: External ear normal.      Left Ear: External ear normal.      Mouth/Throat:      Mouth: Mucous membranes are moist.   Eyes:      Extraocular Movements: Extraocular movements intact.      Conjunctiva/sclera: Conjunctivae normal.      Pupils: Pupils are equal, round, and reactive to light.   Cardiovascular:      Rate and Rhythm: Normal rate and regular rhythm.      Heart sounds: No murmur heard.      Pulmonary:      Effort: Pulmonary effort is normal.      Breath sounds: Normal breath sounds. No wheezing, rhonchi or rales.   Abdominal:      General: Bowel sounds are normal. There is no distension.      Palpations: Abdomen is soft.      Tenderness: There is no abdominal tenderness.   Musculoskeletal:         General: Normal range of motion.   Skin:     General: Skin is warm and dry.   Neurological:      Mental Status: She is alert and oriented to person, place, and time.      Deep Tendon Reflexes: Reflexes normal.   Psychiatric:         Mood and Affect: Mood and affect normal.         Behavior:  Behavior normal.         Thought Content: Thought content normal.         Judgment: Judgment normal.        Lab Results   Component Value Date    GLUCOSE 95 07/30/2021    BUN 10 07/30/2021    CREATININE 0.85 07/30/2021    EGFRIFNONA 76 07/30/2021    BCR 11.8 07/30/2021    K 4.2 07/30/2021    CO2 24.0 07/30/2021    CALCIUM 9.2 07/30/2021    ALBUMIN 4.50 07/30/2021    LABIL2 1.8 11/13/2020    AST 17 07/30/2021    ALT 11 07/30/2021     Lab Results   Component Value Date    TSH 2.740 07/30/2021            Assessment and Plan    Diagnoses and all orders for this visit:    1. Annual physical exam (Primary)  Assessment & Plan:  She is reportedly UTD on pap smear, last done 3/2019 and this was normal.  She is planning to get in with Dr. Brooks ([facility]) for her repeat.  No h/o abnormal pap smear.  She is UTD on COVID (Pfizer x2, Moderna booster),Tdap (5 years ago with her last pregnancy), and flu (10/2021).  She is due for routine labs; ordered.    Orders:  -     Comprehensive Metabolic Panel; Future  -     Lipid Panel; Future    2. Acquired hypothyroidism  Assessment & Plan:  She has been stable on levothyroxine 25 mcg daily.  Labs are reviewed.  Rechecking labs today.  No refills needed.    Orders:  -     TSH; Future    3. Migraine without aura and without status migrainosus, not intractable  Assessment & Plan:  Headaches are fairly well controlled.  She has about 1 migraine every other month.  Headaches are well controlled with sumatriptan 50 mg.  No refills needed.        4. Gastroesophageal reflux disease without esophagitis  Assessment & Plan:  Stable on omeprazole 40 mg daily.  No refills needed today.      5. Encounter for screening for other viral diseases  -     Hepatitis C Antibody; Future      Follow Up   Return in about 1 year (around 2/9/2023) for Annual physical.  Patient was given instructions and counseling regarding her condition or for health maintenance advice. Please see specific information pulled into the  AVS if appropriate.

## 2022-02-09 NOTE — ASSESSMENT & PLAN NOTE
Headaches are fairly well controlled.  She has about 1 migraine every other month.  Headaches are well controlled with sumatriptan 50 mg.  No refills needed.

## 2022-02-09 NOTE — ASSESSMENT & PLAN NOTE
She is reportedly UTD on pap smear, last done 3/2019 and this was normal.  She is planning to get in with Dr. Brooks ([facility]) for her repeat.  No h/o abnormal pap smear.  She is UTD on COVID (Pfizer x2, Moderna booster),Tdap (5 years ago with her last pregnancy), and flu (10/2021).  She is due for routine labs; ordered.

## 2022-02-09 NOTE — ASSESSMENT & PLAN NOTE
She has been stable on levothyroxine 25 mcg daily.  Labs are reviewed.  Rechecking labs today.  No refills needed.

## 2022-03-14 ENCOUNTER — TELEPHONE (OUTPATIENT)
Dept: FAMILY MEDICINE CLINIC | Age: 37
End: 2022-03-14

## 2022-04-15 NOTE — PROGRESS NOTES
"Well Woman Visit    CC: WWE     Myriad intake: Yes,  DID NOT QUALIFY TODAY   Tobacco/Nicotine use:  No    HPI:   37 y.o. Contraception or HRT: None  Menses:   q 32 days, lasts 3-5 days, changes products q 2 hrs on heaviest days.   Pain:  None    Pt has concerns she would like to discuss.      History: PMHx, Meds, Allergies, PSHx, Social Hx, and POBHx all reviewed and updated.  PCP:Ginette Solano MD     Review of Systems     /87   Pulse 72   Ht 167.6 cm (66\")   Wt 90.4 kg (199 lb 6.4 oz)   LMP 2022   Breastfeeding No   BMI 32.18 kg/m²     Physical Exam  Vitals and nursing note reviewed. Exam conducted with a chaperone present.   Constitutional:       Appearance: Normal appearance.   Neck:      Thyroid: No thyroid mass or thyromegaly.   Cardiovascular:      Rate and Rhythm: Normal rate and regular rhythm.      Heart sounds: Normal heart sounds.   Pulmonary:      Effort: Pulmonary effort is normal.      Breath sounds: Normal breath sounds.   Chest:   Breasts:      Right: Normal. No mass, nipple discharge or skin change.      Left: Normal. No mass, nipple discharge or skin change.       Abdominal:      General: Abdomen is flat. Bowel sounds are normal.      Palpations: Abdomen is soft.   Genitourinary:     General: Normal vulva.      Exam position: Lithotomy position.      Labia:         Right: No lesion.         Left: No lesion.       Urethra: No prolapse or urethral lesion.      Vagina: Normal.      Cervix: Normal.      Uterus: Normal.       Adnexa: Right adnexa normal and left adnexa normal.      Rectum: No external hemorrhoid.   Musculoskeletal:      Cervical back: Full passive range of motion without pain.   Neurological:      Mental Status: She is alert.         ASSESSMENT AND PLAN:  WWE    Diagnoses and all orders for this visit:    1. Well woman exam with routine gynecological exam (Primary)  -     IgP, Aptima HPV    2. Encounter for preconception consultation  Assessment & " Plan:  Patient states she and her  are interested in having a third child  She has had 2 previous C-sections and understands that delivery would be a repeat   Patient has regular cycles with an occasional anovulatory cycle  We discussed timing of intercourse with ovulation  Patient was counseled regarding the increased risk of Down syndrome and any chromosomal abnormality given her age of 37  We also discussed decreasing fertility with advancing age  I recommend she start prenatal vitamins  We also discussed increased risks of .   increasing risk of diabetes and hypertension with advanced maternal age        Counseling:     Track menses, RTO IF <q21d, >7d long, or heavy  Recommend daily calcium and vitamin D  Recommend weightbearing exercise  Discussed self breast exams  Preventative:   Follow up PCP/Specialist PMHx and Labs      s/p COVID vaccine        She understands the importance of having any ordered tests to be performed in a timely fashion.  The risks of not performing them include, but are not limited to, advanced cancer stages, bone loss from osteoporosis and/or subsequent increase in morbidity and/or mortality.  She is encouraged to review her results online and/or contact or office if she has questions.     Follow Up:  Return in about 1 year (around 2023).        Jennifer Brooks MD  2022

## 2022-04-18 ENCOUNTER — OFFICE VISIT (OUTPATIENT)
Dept: OBSTETRICS AND GYNECOLOGY | Facility: CLINIC | Age: 37
End: 2022-04-18

## 2022-04-18 VITALS
WEIGHT: 199.4 LBS | SYSTOLIC BLOOD PRESSURE: 133 MMHG | HEART RATE: 72 BPM | BODY MASS INDEX: 32.05 KG/M2 | HEIGHT: 66 IN | DIASTOLIC BLOOD PRESSURE: 87 MMHG

## 2022-04-18 DIAGNOSIS — Z01.419 WELL WOMAN EXAM WITH ROUTINE GYNECOLOGICAL EXAM: Primary | ICD-10-CM

## 2022-04-18 DIAGNOSIS — Z31.69 ENCOUNTER FOR PRECONCEPTION CONSULTATION: ICD-10-CM

## 2022-04-18 PROCEDURE — 87624 HPV HI-RISK TYP POOLED RSLT: CPT | Performed by: OBSTETRICS & GYNECOLOGY

## 2022-04-18 PROCEDURE — 99395 PREV VISIT EST AGE 18-39: CPT | Performed by: OBSTETRICS & GYNECOLOGY

## 2022-04-18 PROCEDURE — G0123 SCREEN CERV/VAG THIN LAYER: HCPCS | Performed by: OBSTETRICS & GYNECOLOGY

## 2022-04-18 NOTE — ASSESSMENT & PLAN NOTE
Patient states she and her  are interested in having a third child  She has had 2 previous C-sections and understands that delivery would be a repeat   Patient has regular cycles with an occasional anovulatory cycle  We discussed timing of intercourse with ovulation  Patient was counseled regarding the increased risk of Down syndrome and any chromosomal abnormality given her age of 37  We also discussed decreasing fertility with advancing age  I recommend she start prenatal vitamins  We also discussed increased risks of .   increasing risk of diabetes and hypertension with advanced maternal age

## 2022-04-22 LAB
CYTOLOGIST CVX/VAG CYTO: NORMAL
CYTOLOGY CVX/VAG DOC CYTO: NORMAL
CYTOLOGY CVX/VAG DOC THIN PREP: NORMAL
DX ICD CODE: NORMAL
HIV 1 & 2 AB SER-IMP: NORMAL
HPV I/H RISK 4 DNA CVX QL PROBE+SIG AMP: NEGATIVE
OTHER STN SPEC: NORMAL
STAT OF ADQ CVX/VAG CYTO-IMP: NORMAL

## 2022-05-02 RX ORDER — OMEPRAZOLE 40 MG/1
CAPSULE, DELAYED RELEASE ORAL
Qty: 90 CAPSULE | Refills: 1 | Status: SHIPPED | OUTPATIENT
Start: 2022-05-02 | End: 2023-02-10 | Stop reason: SDUPTHER

## 2022-05-12 RX ORDER — SUMATRIPTAN 50 MG/1
TABLET, FILM COATED ORAL
Qty: 27 TABLET | Refills: 1 | Status: SHIPPED | OUTPATIENT
Start: 2022-05-12 | End: 2023-02-10

## 2022-05-25 RX ORDER — PNV NO.95/FERROUS FUM/FOLIC AC 28MG-0.8MG
TABLET ORAL
Qty: 90 TABLET | Refills: 3 | Status: SHIPPED | OUTPATIENT
Start: 2022-05-25

## 2022-05-30 DIAGNOSIS — E03.9 ACQUIRED HYPOTHYROIDISM: ICD-10-CM

## 2022-05-31 RX ORDER — LEVOTHYROXINE SODIUM 0.03 MG/1
TABLET ORAL
Qty: 90 TABLET | Refills: 1 | Status: SHIPPED | OUTPATIENT
Start: 2022-05-31 | End: 2023-01-10

## 2022-07-11 NOTE — PROGRESS NOTES
Chief Complaint  Back Pain (Pt c/o (L) sided lower back pain.)    Subjective          Jolene Gonzales presents to [facility]  Low back pain that started yesterday that wraps from left flank to her front. This morning, a rash appeared on this side. Recently started menstrual cycle, which ended yesterday. She denies dysuria, urinary frequency, and urinary urgency. She reports itching with back pain and the three spots in the front. She reports that the back pain is throbbing and will radiate to the front. Unsure if she had chickenpox as child.      Objective   Vital Signs:   /93 (BP Location: Left arm, Patient Position: Sitting)   Pulse 69   Wt 90.1 kg (198 lb 9.6 oz)   BMI 32.05 kg/m²     Physical Exam  Constitutional:       General: She is not in acute distress.     Appearance: Normal appearance. She is normal weight.   HENT:      Head: Normocephalic.   Eyes:      Pupils: Pupils are equal, round, and reactive to light.      Visual Fields: Right eye visual fields normal and left eye visual fields normal.   Neck:      Trachea: Trachea normal.   Cardiovascular:      Rate and Rhythm: Normal rate and regular rhythm.      Heart sounds: Normal heart sounds.   Pulmonary:      Effort: Pulmonary effort is normal.      Breath sounds: Normal breath sounds and air entry.   Musculoskeletal:      Right lower leg: No edema.      Left lower leg: No edema.   Skin:     General: Skin is warm and dry.      Findings: Rash present. Rash is vesicular.          Neurological:      Mental Status: She is alert and oriented to person, place, and time.   Psychiatric:         Mood and Affect: Mood and affect normal.         Behavior: Behavior normal.         Thought Content: Thought content normal.        Result Review :   The following data was reviewed by: MARIANO Costello on 07/12/2022:                  Assessment and Plan    Diagnoses and all orders for this visit:    1. Herpes zoster without  complication (Primary)  -     valACYclovir (Valtrex) 1000 MG tablet; Take 1 tablet by mouth 3 (Three) Times a Day for 7 days.  Dispense: 21 tablet; Refill: 0  -     methylPREDNISolone (MEDROL) 4 MG dose pack; Take as directed on package instructions.  Dispense: 21 each; Refill: 0    2. Low back pain, unspecified back pain laterality, unspecified chronicity, unspecified whether sciatica present  -     POCT urinalysis dipstick, automated  -     Urine Culture - Urine, Urine, Clean Catch; Future  -     Urine Culture - Urine, Urine, Clean Catch    I believe her symptoms may be due to shingles.  The rash on her front looks like it is trying to form vesicles.  She does report having a shooting pain in her back.  We will treat with Medrol pack and Valtrex.      Follow Up   Return if symptoms worsen or fail to improve.  Patient was given instructions and counseling regarding her condition or for health maintenance advice. Please see specific information pulled into the AVS if appropriate.

## 2022-07-12 ENCOUNTER — OFFICE VISIT (OUTPATIENT)
Dept: FAMILY MEDICINE CLINIC | Age: 37
End: 2022-07-12

## 2022-07-12 VITALS
HEART RATE: 69 BPM | SYSTOLIC BLOOD PRESSURE: 148 MMHG | BODY MASS INDEX: 32.05 KG/M2 | DIASTOLIC BLOOD PRESSURE: 93 MMHG | WEIGHT: 198.6 LBS

## 2022-07-12 DIAGNOSIS — B02.9 HERPES ZOSTER WITHOUT COMPLICATION: Primary | ICD-10-CM

## 2022-07-12 DIAGNOSIS — M54.50 LOW BACK PAIN, UNSPECIFIED BACK PAIN LATERALITY, UNSPECIFIED CHRONICITY, UNSPECIFIED WHETHER SCIATICA PRESENT: ICD-10-CM

## 2022-07-12 LAB
BILIRUB BLD-MCNC: NEGATIVE MG/DL
CLARITY, POC: CLEAR
COLOR UR: NORMAL
EXPIRATION DATE: NORMAL
GLUCOSE UR STRIP-MCNC: NEGATIVE MG/DL
KETONES UR QL: NEGATIVE
LEUKOCYTE EST, POC: NEGATIVE
Lab: NORMAL
NITRITE UR-MCNC: NEGATIVE MG/ML
PH UR: 6 [PH] (ref 5–8)
PROT UR STRIP-MCNC: NEGATIVE MG/DL
RBC # UR STRIP: NEGATIVE /UL
SP GR UR: 1.03 (ref 1–1.03)
UROBILINOGEN UR QL: NORMAL

## 2022-07-12 PROCEDURE — 87086 URINE CULTURE/COLONY COUNT: CPT | Performed by: NURSE PRACTITIONER

## 2022-07-12 PROCEDURE — 81003 URINALYSIS AUTO W/O SCOPE: CPT | Performed by: NURSE PRACTITIONER

## 2022-07-12 PROCEDURE — 99213 OFFICE O/P EST LOW 20 MIN: CPT | Performed by: NURSE PRACTITIONER

## 2022-07-12 RX ORDER — VALACYCLOVIR HYDROCHLORIDE 1 G/1
1000 TABLET, FILM COATED ORAL 3 TIMES DAILY
Qty: 21 TABLET | Refills: 0 | Status: SHIPPED | OUTPATIENT
Start: 2022-07-12 | End: 2022-07-19

## 2022-07-12 RX ORDER — METHYLPREDNISOLONE 4 MG/1
TABLET ORAL
Qty: 21 EACH | Refills: 0 | Status: SHIPPED | OUTPATIENT
Start: 2022-07-12 | End: 2023-02-10

## 2022-07-13 LAB — BACTERIA SPEC AEROBE CULT: NO GROWTH

## 2022-07-14 ENCOUNTER — TELEPHONE (OUTPATIENT)
Dept: FAMILY MEDICINE CLINIC | Age: 37
End: 2022-07-14

## 2022-07-14 NOTE — TELEPHONE ENCOUNTER
Caller: Jolene Gonzales    Relationship: Self    Best call back number: [phone]    What medication are you requesting: IBUPROFEN 600      If a prescription is needed, what is your preferred pharmacy and phone number: [facility] - [place], [place] - [address] [facility] [address] [facility] [phone] [facility] [phone] FX     Additional notes:SHAYAN CURRENTLY HAS SHINGLES AND SAID SHE IS HAVING A LOT OF PROBLEMS SLEEPING AT NIGHT DUE TO PAIN. SHE HAD TAKEN SOME OLD IBUPROFEN 600 THAT SHE HAD FROM A C -SECTION LAST NIGHT AND IT HELPED HER GREATLY. SHE WOULD LIKE TO HAVE THAT MEDICATION IF POSSIBLE PLEASE.

## 2023-01-07 DIAGNOSIS — E03.9 ACQUIRED HYPOTHYROIDISM: ICD-10-CM

## 2023-01-09 ENCOUNTER — CLINICAL SUPPORT (OUTPATIENT)
Dept: FAMILY MEDICINE CLINIC | Age: 38
End: 2023-01-09
Payer: COMMERCIAL

## 2023-01-09 DIAGNOSIS — Z23 NEED FOR VACCINATION: Primary | ICD-10-CM

## 2023-01-09 PROCEDURE — 0124A PR ADM SARSCOV2 30MCG/0.3ML BST: CPT | Performed by: FAMILY MEDICINE

## 2023-01-09 PROCEDURE — 91312 COVID-19 (PFIZER) BIVALENT BOOSTER 12+YRS: CPT | Performed by: FAMILY MEDICINE

## 2023-01-10 RX ORDER — LEVOTHYROXINE SODIUM 0.03 MG/1
TABLET ORAL
Qty: 90 TABLET | Refills: 1 | Status: SHIPPED | OUTPATIENT
Start: 2023-01-10 | End: 2023-02-10 | Stop reason: SDUPTHER

## 2023-02-10 ENCOUNTER — OFFICE VISIT (OUTPATIENT)
Dept: FAMILY MEDICINE CLINIC | Age: 38
End: 2023-02-10
Payer: COMMERCIAL

## 2023-02-10 VITALS
SYSTOLIC BLOOD PRESSURE: 107 MMHG | BODY MASS INDEX: 31.53 KG/M2 | WEIGHT: 196.2 LBS | HEIGHT: 66 IN | HEART RATE: 80 BPM | DIASTOLIC BLOOD PRESSURE: 55 MMHG

## 2023-02-10 DIAGNOSIS — K21.9 GASTROESOPHAGEAL REFLUX DISEASE WITHOUT ESOPHAGITIS: ICD-10-CM

## 2023-02-10 DIAGNOSIS — Z00.00 ANNUAL PHYSICAL EXAM: Primary | ICD-10-CM

## 2023-02-10 DIAGNOSIS — E03.9 ACQUIRED HYPOTHYROIDISM: ICD-10-CM

## 2023-02-10 PROBLEM — Z31.69 ENCOUNTER FOR PRECONCEPTION CONSULTATION: Status: RESOLVED | Noted: 2022-04-18 | Resolved: 2023-02-10

## 2023-02-10 PROCEDURE — 99395 PREV VISIT EST AGE 18-39: CPT | Performed by: FAMILY MEDICINE

## 2023-02-10 RX ORDER — LEVOTHYROXINE SODIUM 0.05 MG/1
TABLET ORAL
COMMUNITY
Start: 2023-01-25

## 2023-02-10 RX ORDER — ASPIRIN 81 MG/1
1 TABLET, COATED ORAL DAILY
COMMUNITY
Start: 2022-11-28

## 2023-02-10 RX ORDER — OMEPRAZOLE 20 MG/1
CAPSULE, DELAYED RELEASE ORAL
COMMUNITY
Start: 2023-01-13

## 2023-02-10 NOTE — ASSESSMENT & PLAN NOTE
She is 23 weeks pregnant!  Following with Dr. Brittny Zuñiga.  Prenatal labs all UTD.  She is Rh negative and has received her first Rhogam shot.  No sx bothering her.      She is UTD on pap smear, last done 4/2022 and this was normal.  No h/o abnormal pap smear.  She is UTD on COVID,Tdap (1/2017), and flu (10/2022).  She is UTD on routine labs.

## 2023-02-10 NOTE — PROGRESS NOTES
Chief Complaint  Annual Exam    Subjective          Jolene Gonzales presents to [facility] today for her annual physical.      She is 23 weeks pregnant!  Following with Dr. Brittny Zuñiga.  Prenatal labs all UTD.  She is Rh negative and has received her first Rhogam shot.  No sx bothering her.      She is UTD on pap smear, last done 4/2022 and this was normal.  No h/o abnormal pap smear.  She is UTD on COVID,Tdap (1/2017), and flu (10/2022).  She is UTD on routine labs.     She has noticed rare palpitations ever sine her COVID infection Jan 2021.  They are very sporadic and unpredictable.  No associated CP or SOB, no lightheadedness or syncope.     She is on levothyroxine for hypothyroidism.  No cold/heat intolerance or changes in hair or skin.  Levothyroxine recently increased with recheck planned at 6 weeks.       She is on Prilosec for GERD.  No heartburn or indigestion.        Review of Systems   Constitutional: Negative for chills, fatigue and fever.   HENT: Negative for congestion, hearing loss and rhinorrhea.    Eyes: Negative for pain and visual disturbance.   Respiratory: Negative for cough and shortness of breath.    Cardiovascular: Negative for chest pain and palpitations.   Gastrointestinal: Negative for abdominal pain, constipation, diarrhea, nausea and vomiting.   Genitourinary: Negative for dysuria and hematuria.   Musculoskeletal: Negative for arthralgias and myalgias.   Skin: Negative for rash.   Neurological: Negative for weakness and numbness.   Psychiatric/Behavioral: Negative for dysphoric mood and sleep disturbance. The patient is not nervous/anxious.          Current Outpatient Medications:   •  Aspirin Low Dose 81 MG EC tablet, Take 1 tablet by mouth Daily., Disp: , Rfl:   •  levothyroxine (SYNTHROID, LEVOTHROID) 50 MCG tablet, , Disp: , Rfl:   •  omeprazole (priLOSEC) 20 MG capsule, , Disp: , Rfl:   •  Prenatal Vit-Fe Fumarate-FA (prenatal vitamin 28-0.8) 28-0.8  "MG tablet tablet, TAKE 1 TABLET EVERY DAY, Disp: 90 tablet, Rfl: 3  •  triamcinolone (KENALOG) 0.1 % cream, Apply  topically to the appropriate area as directed 2 (Two) Times a Day., Disp: , Rfl:     Allergies:  Codeine      Objective   Vital Signs:   Vitals:    02/10/23 0837   BP: 107/55   BP Location: Left arm   Patient Position: Sitting   Pulse: 80   Weight: 89 kg (196 lb 3.2 oz)   Height: 167.6 cm (65.98\")     Physical Exam  Vitals reviewed.   Constitutional:       General: She is not in acute distress.     Appearance: Normal appearance. She is well-developed.   HENT:      Head: Normocephalic and atraumatic.      Right Ear: External ear normal.      Left Ear: External ear normal.      Mouth/Throat:      Mouth: Mucous membranes are moist.   Eyes:      Extraocular Movements: Extraocular movements intact.      Conjunctiva/sclera: Conjunctivae normal.      Pupils: Pupils are equal, round, and reactive to light.   Cardiovascular:      Rate and Rhythm: Normal rate and regular rhythm.      Heart sounds: No murmur heard.  Pulmonary:      Effort: Pulmonary effort is normal.      Breath sounds: Normal breath sounds. No wheezing, rhonchi or rales.   Abdominal:      General: Bowel sounds are normal. There is no distension.      Palpations: Abdomen is soft.      Tenderness: There is no abdominal tenderness.   Musculoskeletal:         General: Normal range of motion.   Skin:     General: Skin is warm and dry.   Neurological:      Mental Status: She is alert and oriented to person, place, and time.      Deep Tendon Reflexes: Reflexes normal.   Psychiatric:         Mood and Affect: Mood and affect normal.         Behavior: Behavior normal.         Thought Content: Thought content normal.         Judgment: Judgment normal.             Assessment and Plan    Diagnoses and all orders for this visit:    1. Annual physical exam (Primary)  Assessment & Plan:  She is 23 weeks pregnant!  Following with Dr. Brittny Zuñiga.  Prenatal labs " all UTD.  She is Rh negative and has received her first Rhogam shot.  No sx bothering her.      She is UTD on pap smear, last done 4/2022 and this was normal.  No h/o abnormal pap smear.  She is UTD on COVID,Tdap (1/2017), and flu (10/2022).  She is UTD on routine labs.      2. Acquired hypothyroidism  Assessment & Plan:  Stable on levothyroxine.  Recently increased by OB with recheck planned for 6 weeks.        3. Gastroesophageal reflux disease without esophagitis  Assessment & Plan:  Stable on omeprazole 20 mg prn.        Follow Up   Return in about 1 year (around 2/10/2024) for Annual physical.  Patient was given instructions and counseling regarding her condition or for health maintenance advice. Please see specific information pulled into the AVS if appropriate.         02/10/2023

## 2023-05-30 RX ORDER — PNV NO.95/FERROUS FUM/FOLIC AC 28MG-0.8MG
TABLET ORAL
Qty: 90 TABLET | Refills: 3 | Status: SHIPPED | OUTPATIENT
Start: 2023-05-30

## 2024-02-05 ENCOUNTER — PATIENT MESSAGE (OUTPATIENT)
Dept: FAMILY MEDICINE CLINIC | Age: 39
End: 2024-02-05

## 2024-02-05 DIAGNOSIS — E03.9 ACQUIRED HYPOTHYROIDISM: ICD-10-CM

## 2024-02-05 DIAGNOSIS — Z00.00 ANNUAL PHYSICAL EXAM: Primary | ICD-10-CM

## 2024-02-06 NOTE — TELEPHONE ENCOUNTER
From: Jolene Gonzales  To: Ginette Solano  Sent: 2/5/2024 8:11 AM EST  Subject: Labs Joshua 2/13 visit    Good morning,    Do I need to get labs done before our appt next week? Can I get the lab orders at the office? I am not sure if your lab is in network and will look it up now to verify.

## 2024-02-13 ENCOUNTER — OFFICE VISIT (OUTPATIENT)
Dept: FAMILY MEDICINE CLINIC | Age: 39
End: 2024-02-13
Payer: COMMERCIAL

## 2024-02-13 VITALS
HEIGHT: 66 IN | HEART RATE: 76 BPM | SYSTOLIC BLOOD PRESSURE: 116 MMHG | WEIGHT: 196 LBS | DIASTOLIC BLOOD PRESSURE: 73 MMHG | OXYGEN SATURATION: 100 % | BODY MASS INDEX: 31.5 KG/M2

## 2024-02-13 DIAGNOSIS — Z00.00 ANNUAL PHYSICAL EXAM: Primary | ICD-10-CM

## 2024-02-13 DIAGNOSIS — K21.9 GASTROESOPHAGEAL REFLUX DISEASE WITHOUT ESOPHAGITIS: ICD-10-CM

## 2024-02-13 DIAGNOSIS — E03.9 ACQUIRED HYPOTHYROIDISM: ICD-10-CM

## 2024-02-13 PROBLEM — Z86.16 HISTORY OF COVID-19: Status: RESOLVED | Noted: 2022-02-09 | Resolved: 2024-02-13

## 2024-02-13 PROCEDURE — 99395 PREV VISIT EST AGE 18-39: CPT | Performed by: FAMILY MEDICINE

## 2024-03-12 ENCOUNTER — TELEPHONE (OUTPATIENT)
Dept: FAMILY MEDICINE CLINIC | Age: 39
End: 2024-03-12
Payer: COMMERCIAL

## 2024-04-19 ENCOUNTER — LAB (OUTPATIENT)
Dept: LAB | Facility: HOSPITAL | Age: 39
End: 2024-04-19
Payer: COMMERCIAL

## 2024-04-19 ENCOUNTER — TRANSCRIBE ORDERS (OUTPATIENT)
Dept: ADMINISTRATIVE | Facility: HOSPITAL | Age: 39
End: 2024-04-19
Payer: COMMERCIAL

## 2024-04-19 DIAGNOSIS — D22.39 FIBROUS PAPULE OF NOSE: ICD-10-CM

## 2024-04-19 DIAGNOSIS — D22.62: ICD-10-CM

## 2024-04-19 DIAGNOSIS — D18.01 HEMANGIOMA OF SKIN AND SUBCUTANEOUS TISSUE: ICD-10-CM

## 2024-04-19 DIAGNOSIS — L40.0 PSORIASIS VULGARIS: ICD-10-CM

## 2024-04-19 DIAGNOSIS — Z80.8 FAMILY HISTORY OF MALIGNANT NEOPLASM OF THYROID: ICD-10-CM

## 2024-04-19 DIAGNOSIS — D22.5 PIGMENTED HAIRY EPIDERMAL NEVUS: ICD-10-CM

## 2024-04-19 DIAGNOSIS — Z71.89 COUNSELING ON SUBSTANCE USE AND ABUSE: ICD-10-CM

## 2024-04-19 DIAGNOSIS — Z79.899 ENCOUNTER FOR LONG-TERM (CURRENT) USE OF OTHER MEDICATIONS: ICD-10-CM

## 2024-04-19 DIAGNOSIS — D22.71: ICD-10-CM

## 2024-04-19 DIAGNOSIS — D22.72 ATYPICAL NEVUS OF PLANTAR ASPECT OF FOOT, LEFT: ICD-10-CM

## 2024-04-19 DIAGNOSIS — E03.9 ACQUIRED HYPOTHYROIDISM: ICD-10-CM

## 2024-04-19 DIAGNOSIS — L82.1 OTHER SEBORRHEIC KERATOSIS: ICD-10-CM

## 2024-04-19 DIAGNOSIS — L40.0 PSORIASIS VULGARIS: Primary | ICD-10-CM

## 2024-04-19 DIAGNOSIS — L81.4 LENTIGO SIMPLEX: ICD-10-CM

## 2024-04-19 DIAGNOSIS — D22.61 NEVUS OF UPPER ARM, RIGHT: ICD-10-CM

## 2024-04-19 DIAGNOSIS — Z00.00 ANNUAL PHYSICAL EXAM: ICD-10-CM

## 2024-04-19 LAB
ALBUMIN SERPL-MCNC: 4.3 G/DL (ref 3.5–5.2)
ALBUMIN/GLOB SERPL: 1.7 G/DL
ALP SERPL-CCNC: 61 U/L (ref 39–117)
ALT SERPL W P-5'-P-CCNC: 8 U/L (ref 1–33)
ANION GAP SERPL CALCULATED.3IONS-SCNC: 10.1 MMOL/L (ref 5–15)
AST SERPL-CCNC: 19 U/L (ref 1–32)
BASOPHILS # BLD AUTO: 0.03 10*3/MM3 (ref 0–0.2)
BASOPHILS NFR BLD AUTO: 0.5 % (ref 0–1.5)
BILIRUB CONJ SERPL-MCNC: <0.2 MG/DL (ref 0–0.3)
BILIRUB SERPL-MCNC: 0.4 MG/DL (ref 0–1.2)
BUN SERPL-MCNC: 11 MG/DL (ref 6–20)
BUN/CREAT SERPL: 11.5 (ref 7–25)
CALCIUM SPEC-SCNC: 9.4 MG/DL (ref 8.6–10.5)
CHLORIDE SERPL-SCNC: 105 MMOL/L (ref 98–107)
CHOLEST SERPL-MCNC: 201 MG/DL (ref 0–200)
CO2 SERPL-SCNC: 24.9 MMOL/L (ref 22–29)
CREAT SERPL-MCNC: 0.96 MG/DL (ref 0.57–1)
DEPRECATED RDW RBC AUTO: 45 FL (ref 37–54)
EGFRCR SERPLBLD CKD-EPI 2021: 77.3 ML/MIN/1.73
EOSINOPHIL # BLD AUTO: 0.14 10*3/MM3 (ref 0–0.4)
EOSINOPHIL NFR BLD AUTO: 2.4 % (ref 0.3–6.2)
ERYTHROCYTE [DISTWIDTH] IN BLOOD BY AUTOMATED COUNT: 12.1 % (ref 12.3–15.4)
GLOBULIN UR ELPH-MCNC: 2.6 GM/DL
GLUCOSE SERPL-MCNC: 87 MG/DL (ref 65–99)
HCT VFR BLD AUTO: 39.3 % (ref 34–46.6)
HDLC SERPL-MCNC: 59 MG/DL (ref 40–60)
HGB BLD-MCNC: 13.1 G/DL (ref 12–15.9)
IMM GRANULOCYTES # BLD AUTO: 0.01 10*3/MM3 (ref 0–0.05)
IMM GRANULOCYTES NFR BLD AUTO: 0.2 % (ref 0–0.5)
LDLC SERPL CALC-MCNC: 130 MG/DL (ref 0–100)
LDLC/HDLC SERPL: 2.18 {RATIO}
LYMPHOCYTES # BLD AUTO: 2.4 10*3/MM3 (ref 0.7–3.1)
LYMPHOCYTES NFR BLD AUTO: 40.4 % (ref 19.6–45.3)
MCH RBC QN AUTO: 33.2 PG (ref 26.6–33)
MCHC RBC AUTO-ENTMCNC: 33.3 G/DL (ref 31.5–35.7)
MCV RBC AUTO: 99.7 FL (ref 79–97)
MONOCYTES # BLD AUTO: 0.3 10*3/MM3 (ref 0.1–0.9)
MONOCYTES NFR BLD AUTO: 5.1 % (ref 5–12)
NEUTROPHILS NFR BLD AUTO: 3.06 10*3/MM3 (ref 1.7–7)
NEUTROPHILS NFR BLD AUTO: 51.4 % (ref 42.7–76)
PLATELET # BLD AUTO: 215 10*3/MM3 (ref 140–450)
PMV BLD AUTO: 10.1 FL (ref 6–12)
POTASSIUM SERPL-SCNC: 4.3 MMOL/L (ref 3.5–5.2)
PROT SERPL-MCNC: 6.9 G/DL (ref 6–8.5)
RBC # BLD AUTO: 3.94 10*6/MM3 (ref 3.77–5.28)
SODIUM SERPL-SCNC: 140 MMOL/L (ref 136–145)
TRIGL SERPL-MCNC: 67 MG/DL (ref 0–150)
TSH SERPL DL<=0.05 MIU/L-ACNC: 3.79 UIU/ML (ref 0.27–4.2)
VLDLC SERPL-MCNC: 12 MG/DL (ref 5–40)
WBC NRBC COR # BLD AUTO: 5.94 10*3/MM3 (ref 3.4–10.8)

## 2024-04-19 PROCEDURE — 86480 TB TEST CELL IMMUN MEASURE: CPT

## 2024-04-19 PROCEDURE — 87340 HEPATITIS B SURFACE AG IA: CPT

## 2024-04-19 PROCEDURE — 80061 LIPID PANEL: CPT

## 2024-04-19 PROCEDURE — 80050 GENERAL HEALTH PANEL: CPT

## 2024-04-19 PROCEDURE — 82248 BILIRUBIN DIRECT: CPT

## 2024-04-19 PROCEDURE — 36415 COLL VENOUS BLD VENIPUNCTURE: CPT

## 2024-04-19 PROCEDURE — 86803 HEPATITIS C AB TEST: CPT

## 2024-04-19 PROCEDURE — 86706 HEP B SURFACE ANTIBODY: CPT

## 2024-04-19 PROCEDURE — 86704 HEP B CORE ANTIBODY TOTAL: CPT

## 2024-04-20 LAB
CHOLEST SERPL-MCNC: 198 MG/DL (ref 0–200)
HBV SURFACE AB SER RIA-ACNC: NORMAL
HBV SURFACE AG SERPL QL IA: NORMAL
HCV AB SER QL: NORMAL
HDLC SERPL-MCNC: 58 MG/DL (ref 40–60)
LDLC SERPL CALC-MCNC: 127 MG/DL (ref 0–100)
LDLC/HDLC SERPL: 2.17 {RATIO}
TRIGL SERPL-MCNC: 72 MG/DL (ref 0–150)
VLDLC SERPL-MCNC: 13 MG/DL (ref 5–40)

## 2024-04-21 LAB — HBV CORE AB SERPL QL IA: NEGATIVE

## 2024-04-22 LAB
GAMMA INTERFERON BACKGROUND BLD IA-ACNC: 0.02 IU/ML
M TB IFN-G BLD-IMP: NEGATIVE
M TB IFN-G CD4+ BCKGRND COR BLD-ACNC: 0.02 IU/ML
M TB IFN-G CD4+CD8+ BCKGRND COR BLD-ACNC: 0.02 IU/ML
MITOGEN IGNF BCKGRD COR BLD-ACNC: >10 IU/ML
QUANTIFERON INCUBATION: NORMAL
SERVICE CMNT-IMP: NORMAL

## 2024-06-17 ENCOUNTER — PATIENT MESSAGE (OUTPATIENT)
Dept: FAMILY MEDICINE CLINIC | Age: 39
End: 2024-06-17
Payer: COMMERCIAL

## 2024-06-17 DIAGNOSIS — K21.9 GASTROESOPHAGEAL REFLUX DISEASE WITHOUT ESOPHAGITIS: Primary | ICD-10-CM

## 2024-06-17 DIAGNOSIS — E03.9 ACQUIRED HYPOTHYROIDISM: ICD-10-CM

## 2024-06-19 RX ORDER — LEVOTHYROXINE SODIUM 0.05 MG/1
50 TABLET ORAL DAILY
Qty: 90 TABLET | Refills: 3 | Status: SHIPPED | OUTPATIENT
Start: 2024-06-19

## 2024-06-19 RX ORDER — OMEPRAZOLE 40 MG/1
40 CAPSULE, DELAYED RELEASE ORAL DAILY
Qty: 90 CAPSULE | Refills: 3 | Status: SHIPPED | OUTPATIENT
Start: 2024-06-19

## 2024-06-19 NOTE — TELEPHONE ENCOUNTER
From: Jolene Gonzales  To: Ginette Solano  Sent: 6/17/2024 11:30 AM EDT  Subject: Thyroid Meds    Hello, my OBGYN got the request for my thyroid prescription refill. She was the last to adjust it during my pregnancy. Can you please send a new prescription to [facility] for me for the Levothyroixine 50mcg and can I get moved back to the Omeprazole 40mg. The only refills I have are for the 20mg from when I was pregnant as well. Let me know if you have any questions or call at [phone].

## 2024-11-13 ENCOUNTER — OFFICE VISIT (OUTPATIENT)
Dept: FAMILY MEDICINE CLINIC | Age: 39
End: 2024-11-13
Payer: COMMERCIAL

## 2024-11-13 VITALS
HEART RATE: 81 BPM | TEMPERATURE: 98.4 F | SYSTOLIC BLOOD PRESSURE: 129 MMHG | DIASTOLIC BLOOD PRESSURE: 86 MMHG | HEIGHT: 66 IN | BODY MASS INDEX: 33.56 KG/M2 | WEIGHT: 208.8 LBS | OXYGEN SATURATION: 98 %

## 2024-11-13 DIAGNOSIS — N30.00 ACUTE CYSTITIS WITHOUT HEMATURIA: Primary | ICD-10-CM

## 2024-11-13 LAB
BILIRUB BLD-MCNC: NEGATIVE MG/DL
CLARITY, POC: ABNORMAL
COLOR UR: ABNORMAL
EXPIRATION DATE: ABNORMAL
GLUCOSE UR STRIP-MCNC: NEGATIVE MG/DL
KETONES UR QL: NEGATIVE
LEUKOCYTE EST, POC: ABNORMAL
Lab: ABNORMAL
NITRITE UR-MCNC: NEGATIVE MG/ML
PH UR: 6 [PH] (ref 5–8)
PROT UR STRIP-MCNC: ABNORMAL MG/DL
RBC # UR STRIP: ABNORMAL /UL
SP GR UR: 1.02 (ref 1–1.03)
UROBILINOGEN UR QL: ABNORMAL

## 2024-11-13 PROCEDURE — 81003 URINALYSIS AUTO W/O SCOPE: CPT | Performed by: INTERNAL MEDICINE

## 2024-11-13 PROCEDURE — 99213 OFFICE O/P EST LOW 20 MIN: CPT | Performed by: INTERNAL MEDICINE

## 2024-11-13 PROCEDURE — 87186 SC STD MICRODIL/AGAR DIL: CPT | Performed by: INTERNAL MEDICINE

## 2024-11-13 PROCEDURE — 87088 URINE BACTERIA CULTURE: CPT | Performed by: INTERNAL MEDICINE

## 2024-11-13 PROCEDURE — 87086 URINE CULTURE/COLONY COUNT: CPT | Performed by: INTERNAL MEDICINE

## 2024-11-13 RX ORDER — NITROFURANTOIN 25; 75 MG/1; MG/1
100 CAPSULE ORAL 2 TIMES DAILY
Qty: 10 CAPSULE | Refills: 0 | Status: SHIPPED | OUTPATIENT
Start: 2024-11-13 | End: 2024-11-18

## 2024-11-13 RX ORDER — RISANKIZUMAB-RZAA 150 MG/ML
INJECTION SUBCUTANEOUS
COMMUNITY
Start: 2024-10-19

## 2024-11-13 NOTE — PROGRESS NOTES
"Chief Complaint  Urinary Frequency (W/ burning )    Subjective      Jolene Gonzales is a 39 y.o. female who presents to [facility]     Patient Care Team:  Ginette Solano MD as PCP - General (Family Medicine)  Patient presents with chief complaint of urinary frequency.  Patient reports that her symptoms started approximate only 1 day ago.  She was up all night urinating and feeling like she was not emptying her bladder.  Urinalysis was done in the clinic today.  She denies any discharge or irritation.    Review of Systems  Full review of systems obtained and pertinent positives states in above HPI.  Otherwise all others reviewed and are negative.    Objective   Vital Signs:   Vitals:    11/13/24 0935   BP: 129/86   BP Location: Right arm   Patient Position: Sitting   Cuff Size: Adult   Pulse: 81   Temp: 98.4 °F (36.9 °C)   TempSrc: Oral   SpO2: 98%   Weight: 94.7 kg (208 lb 12.8 oz)   Height: 167.6 cm (65.98\")     Body mass index is 33.72 kg/m².    Wt Readings from Last 3 Encounters:   11/13/24 94.7 kg (208 lb 12.8 oz)   02/13/24 88.9 kg (196 lb)   02/10/23 89 kg (196 lb 3.2 oz)     BP Readings from Last 3 Encounters:   11/13/24 129/86   02/13/24 116/73   02/10/23 107/55       Health Maintenance   Topic Date Due    INFLUENZA VACCINE  08/01/2024    COVID-19 Vaccine (5 - 2024-25 season) 09/01/2024    ANNUAL PHYSICAL  02/13/2025    BMI FOLLOWUP  02/13/2025    PAP SMEAR  04/18/2025    TDAP/TD VACCINES (3 - Td or Tdap) 03/20/2033    HEPATITIS C SCREENING  Completed    Pneumococcal Vaccine 0-64  Aged Out     Urine dipstick shows positive for protein and positive for leukocytes.  Micro exam: not done.    Physical Exam   Well-nourished female in no acute distress  No CVA tenderness  Result Review   The following data was reviewed by: Deng Mart MD on 11/13/2024:  [x]  Tests & Results  []  Hospitalization/Emergency Department/Urgent Care  []  Internal/External Consultant " Notes    Procedures          ASSESSMENT/PLAN  Diagnoses and all orders for this visit:    1. Acute cystitis without hematuria (Primary)  Comments:  Macrobid 100 mg by mouth twice daily for 5 days.  Will send urine culture and follow-up with the results  Orders:  -     POCT urinalysis dipstick, automated  -     Urine Culture - Urine, Urine, Clean Catch    Other orders  -     nitrofurantoin, macrocrystal-monohydrate, (Macrobid) 100 MG capsule; Take 1 capsule by mouth 2 (Two) Times a Day for 5 days.  Dispense: 10 capsule; Refill: 0                       FOLLOW UP  No follow-ups on file.  Patient was given instructions and counseling regarding her condition or for health maintenance advice. Please see specific information pulled into the AVS if appropriate.       Deng Mart MD  11/13/24  09:52 EST

## 2024-11-15 LAB — BACTERIA SPEC AEROBE CULT: ABNORMAL

## 2025-02-07 ENCOUNTER — PATIENT MESSAGE (OUTPATIENT)
Dept: FAMILY MEDICINE CLINIC | Age: 40
End: 2025-02-07
Payer: COMMERCIAL

## 2025-02-07 DIAGNOSIS — Z00.00 ANNUAL PHYSICAL EXAM: Primary | ICD-10-CM

## 2025-02-14 ENCOUNTER — OFFICE VISIT (OUTPATIENT)
Dept: FAMILY MEDICINE CLINIC | Age: 40
End: 2025-02-14
Payer: COMMERCIAL

## 2025-02-14 VITALS
OXYGEN SATURATION: 98 % | BODY MASS INDEX: 33.11 KG/M2 | HEART RATE: 72 BPM | HEIGHT: 66 IN | WEIGHT: 206 LBS | DIASTOLIC BLOOD PRESSURE: 84 MMHG | SYSTOLIC BLOOD PRESSURE: 122 MMHG | TEMPERATURE: 98.4 F

## 2025-02-14 DIAGNOSIS — Z00.00 ANNUAL PHYSICAL EXAM: Primary | ICD-10-CM

## 2025-02-14 DIAGNOSIS — Z12.31 SCREENING MAMMOGRAM FOR BREAST CANCER: ICD-10-CM

## 2025-02-14 DIAGNOSIS — E03.9 ACQUIRED HYPOTHYROIDISM: ICD-10-CM

## 2025-02-14 DIAGNOSIS — K21.9 GASTROESOPHAGEAL REFLUX DISEASE WITHOUT ESOPHAGITIS: ICD-10-CM

## 2025-02-14 DIAGNOSIS — E66.09 CLASS 1 OBESITY DUE TO EXCESS CALORIES WITH SERIOUS COMORBIDITY AND BODY MASS INDEX (BMI) OF 33.0 TO 33.9 IN ADULT: ICD-10-CM

## 2025-02-14 DIAGNOSIS — E66.811 CLASS 1 OBESITY DUE TO EXCESS CALORIES WITH SERIOUS COMORBIDITY AND BODY MASS INDEX (BMI) OF 33.0 TO 33.9 IN ADULT: ICD-10-CM

## 2025-02-14 PROCEDURE — 99396 PREV VISIT EST AGE 40-64: CPT | Performed by: FAMILY MEDICINE

## 2025-02-14 RX ORDER — OMEPRAZOLE 40 MG/1
40 CAPSULE, DELAYED RELEASE ORAL DAILY
Qty: 90 CAPSULE | Refills: 3 | Status: SHIPPED | OUTPATIENT
Start: 2025-02-14

## 2025-02-14 RX ORDER — LEVOTHYROXINE SODIUM 50 UG/1
50 TABLET ORAL DAILY
Qty: 90 TABLET | Refills: 3 | Status: SHIPPED | OUTPATIENT
Start: 2025-02-14

## 2025-02-14 NOTE — PROGRESS NOTES
Chief Complaint  Annual Exam    Subjective          Jolene Gonzales presents to [facility] today for her annual physical.      She is UTD on pap smear, last done 4/2022 by GYN and this was normal.  No h/o abnormal pap smear.  Five year-repeat recommended.  She is due for mammogram.  She is UTD on Tdap (3/2023) and flu (11/2024).  She is due for COVID.  She is due for routine labs including CMP, lipids and TSH.  She got the lab slip for CMP and lipids last week but hasn't had a chance to get those done yet.       She is on levothyroxine for hypothyroidism.  No heat/cold intolerance, no changes in skin or hair.    She is on omeprazole for GERD.  No reflux or heartburn.    She is on Skyrizi now for psoriasis after she failed topicals.  She is doing great with it.    Review of Systems   Constitutional:  Negative for chills, fatigue and fever.   HENT:  Negative for congestion, hearing loss and rhinorrhea.    Eyes:  Negative for pain and visual disturbance.   Respiratory:  Negative for cough and shortness of breath.    Cardiovascular:  Negative for chest pain and palpitations.   Gastrointestinal:  Negative for abdominal pain, constipation, diarrhea, nausea and vomiting.   Genitourinary:  Negative for dysuria and hematuria.   Musculoskeletal:  Negative for arthralgias and myalgias.   Skin:  Negative for rash.   Neurological:  Negative for weakness and numbness.   Psychiatric/Behavioral:  Negative for dysphoric mood and sleep disturbance. The patient is not nervous/anxious.          Current Outpatient Medications:   •  levothyroxine (SYNTHROID, LEVOTHROID) 50 MCG tablet, Take 1 tablet by mouth Daily., Disp: 90 tablet, Rfl: 3  •  omeprazole (priLOSEC) 40 MG capsule, Take 1 capsule by mouth Daily., Disp: 90 capsule, Rfl: 3  •  Skyrizi Pen 150 MG/ML solution auto-injector, , Disp: , Rfl:     Allergies:  Codeine      Objective   Vital Signs:   Vitals:    02/14/25 0944   BP: 122/84   BP Location:  "Left arm   Patient Position: Sitting   Cuff Size: Large Adult   Pulse: 72   Temp: 98.4 °F (36.9 °C)   TempSrc: Oral   SpO2: 98%   Weight: 93.4 kg (206 lb)   Height: 167.6 cm (65.98\")     Body mass index is 33.27 kg/m².  BMI is >= 30 and <35. (Class 1 Obesity). The following options were offered after discussion;: exercise counseling/recommendations and nutrition counseling/recommendations      Physical Exam  Vitals reviewed.   Constitutional:       General: She is not in acute distress.     Appearance: Normal appearance. She is well-developed.   HENT:      Head: Normocephalic and atraumatic.      Right Ear: External ear normal.      Left Ear: External ear normal.      Mouth/Throat:      Mouth: Mucous membranes are moist.   Eyes:      Extraocular Movements: Extraocular movements intact.      Conjunctiva/sclera: Conjunctivae normal.      Pupils: Pupils are equal, round, and reactive to light.   Cardiovascular:      Rate and Rhythm: Normal rate and regular rhythm.      Heart sounds: No murmur heard.  Pulmonary:      Effort: Pulmonary effort is normal.      Breath sounds: Normal breath sounds. No wheezing, rhonchi or rales.   Abdominal:      General: Bowel sounds are normal. There is no distension.      Palpations: Abdomen is soft.      Tenderness: There is no abdominal tenderness.   Musculoskeletal:         General: Normal range of motion.   Skin:     General: Skin is warm and dry.   Neurological:      Mental Status: She is alert and oriented to person, place, and time.      Deep Tendon Reflexes: Reflexes normal.   Psychiatric:         Mood and Affect: Mood and affect normal.         Behavior: Behavior normal.         Thought Content: Thought content normal.         Judgment: Judgment normal.        Lab Results   Component Value Date    GLUCOSE 87 04/19/2024    BUN 11 04/19/2024    CREATININE 0.96 04/19/2024    EGFR 77.3 04/19/2024    BCR 11.5 04/19/2024    K 4.3 04/19/2024    CO2 24.9 04/19/2024    CALCIUM 9.4 " 04/19/2024    ALBUMIN 4.3 04/19/2024    BILITOT 0.4 04/19/2024    AST 19 04/19/2024    ALT 8 04/19/2024       Lab Results   Component Value Date    CHOL 201 (H) 04/19/2024    CHOL 198 04/19/2024    CHLPL 203 (H) 11/13/2020    TRIG 67 04/19/2024    TRIG 72 04/19/2024    HDL 59 04/19/2024    HDL 58 04/19/2024     (H) 04/19/2024     (H) 04/19/2024       Lab Results   Component Value Date    WBC 5.94 04/19/2024    HGB 13.1 04/19/2024    HCT 39.3 04/19/2024    MCV 99.7 (H) 04/19/2024     04/19/2024            Assessment and Plan    Assessment & Plan  Annual physical exam  She is UTD on pap smear, last done 4/2022 by GYN and this was normal.  No h/o abnormal pap smear.  Five year-repeat recommended.  She is due for mammogram; ordered.  She is UTD on Tdap (3/2023) and flu (11/2024).  She is due for COVID.  She is due for routine labs including CMP, lipids and TSH; lab slip given for TSH.  She got the lab slip for CMP and lipids last week but hasn't had a chance to get those done yet.         Acquired hypothyroidism  Stable on levothyroxine 50 mcg daily.  Refills were needed today.  Labs were due today.  Continue to monitor.  Orders:  •  levothyroxine (SYNTHROID, LEVOTHROID) 50 MCG tablet; Take 1 tablet by mouth Daily.  •  TSH; Future    Gastroesophageal reflux disease without esophagitis  Stable on omeprazole 40 mg daily.  Refills were needed today.  Continue to monitor.  Wean PPI as able.  Orders:  •  omeprazole (priLOSEC) 40 MG capsule; Take 1 capsule by mouth Daily.    Class 1 obesity due to excess calories with serious comorbidity and body mass index (BMI) of 33.0 to 33.9 in adult  Patient's (Body mass index is 33.27 kg/m².) indicates that they are obese (BMI >30) with health conditions that include GERD . Weight is newly identified. BMI  is above average; BMI management plan is completed. We discussed portion control and increasing exercise.          Screening mammogram for breast  cancer    Orders:  •  Mammo Screening Digital Tomosynthesis Bilateral With CAD; Future              Follow Up   Return in about 1 year (around 2/14/2026) for Annual physical.  Patient was given instructions and counseling regarding her condition or for health maintenance advice. Please see specific information pulled into the AVS if appropriate.         02/14/2025

## 2025-02-14 NOTE — ASSESSMENT & PLAN NOTE
Stable on levothyroxine 50 mcg daily.  Refills were needed today.  Labs were due today.  Continue to monitor.  Orders:    levothyroxine (SYNTHROID, LEVOTHROID) 50 MCG tablet; Take 1 tablet by mouth Daily.    TSH; Future

## 2025-02-14 NOTE — ASSESSMENT & PLAN NOTE
She is UTD on pap smear, last done 4/2022 by GYN and this was normal.  No h/o abnormal pap smear.  Five year-repeat recommended.  She is due for mammogram; ordered.  She is UTD on Tdap (3/2023) and flu (11/2024).  She is due for COVID.  She is due for routine labs including CMP, lipids and TSH; lab slip given for TSH.  She got the lab slip for CMP and lipids last week but hasn't had a chance to get those done yet.

## 2025-02-14 NOTE — ASSESSMENT & PLAN NOTE
Stable on omeprazole 40 mg daily.  Refills were needed today.  Continue to monitor.  Wean PPI as able.  Orders:    omeprazole (priLOSEC) 40 MG capsule; Take 1 capsule by mouth Daily.

## 2025-02-24 ENCOUNTER — TELEPHONE (OUTPATIENT)
Dept: FAMILY MEDICINE CLINIC | Age: 40
End: 2025-02-24
Payer: COMMERCIAL

## 2025-03-21 LAB
ALBUMIN SERPL-MCNC: 4.7 G/DL (ref 3.9–4.9)
ALP SERPL-CCNC: 53 IU/L (ref 44–121)
ALT SERPL-CCNC: 11 IU/L (ref 0–32)
AST SERPL-CCNC: 18 IU/L (ref 0–40)
BILIRUB SERPL-MCNC: 0.4 MG/DL (ref 0–1.2)
BUN SERPL-MCNC: 14 MG/DL (ref 6–24)
BUN/CREAT SERPL: 15 (ref 9–23)
CALCIUM SERPL-MCNC: 9.3 MG/DL (ref 8.7–10.2)
CHLORIDE SERPL-SCNC: 103 MMOL/L (ref 96–106)
CHOLEST SERPL-MCNC: 217 MG/DL (ref 100–199)
CO2 SERPL-SCNC: 21 MMOL/L (ref 20–29)
CREAT SERPL-MCNC: 0.93 MG/DL (ref 0.57–1)
EGFRCR SERPLBLD CKD-EPI 2021: 80 ML/MIN/1.73
GLOBULIN SER CALC-MCNC: 2.3 G/DL (ref 1.5–4.5)
GLUCOSE SERPL-MCNC: 84 MG/DL (ref 70–99)
HDLC SERPL-MCNC: 57 MG/DL
LDLC SERPL CALC-MCNC: 137 MG/DL (ref 0–99)
POTASSIUM SERPL-SCNC: 4.4 MMOL/L (ref 3.5–5.2)
PROT SERPL-MCNC: 7 G/DL (ref 6–8.5)
SODIUM SERPL-SCNC: 138 MMOL/L (ref 134–144)
TRIGL SERPL-MCNC: 129 MG/DL (ref 0–149)
TSH SERPL DL<=0.005 MIU/L-ACNC: 3.88 UIU/ML (ref 0.45–4.5)
VLDLC SERPL CALC-MCNC: 23 MG/DL (ref 5–40)

## 2025-04-22 ENCOUNTER — HOSPITAL ENCOUNTER (OUTPATIENT)
Dept: MAMMOGRAPHY | Facility: HOSPITAL | Age: 40
Discharge: HOME | End: 2025-04-22
Admitting: FAMILY MEDICINE
Payer: COMMERCIAL

## 2025-04-22 DIAGNOSIS — Z12.31 SCREENING MAMMOGRAM FOR BREAST CANCER: ICD-10-CM

## 2025-04-22 PROCEDURE — 77067 SCR MAMMO BI INCL CAD: CPT

## 2025-04-22 PROCEDURE — 77063 BREAST TOMOSYNTHESIS BI: CPT

## 2025-07-18 DIAGNOSIS — E03.9 ACQUIRED HYPOTHYROIDISM: ICD-10-CM

## 2025-07-18 DIAGNOSIS — K21.9 GASTROESOPHAGEAL REFLUX DISEASE WITHOUT ESOPHAGITIS: ICD-10-CM

## 2025-07-21 RX ORDER — LEVOTHYROXINE SODIUM 50 UG/1
50 TABLET ORAL DAILY
Qty: 90 TABLET | Refills: 1 | Status: SHIPPED | OUTPATIENT
Start: 2025-07-21

## 2025-07-21 RX ORDER — OMEPRAZOLE 40 MG/1
40 CAPSULE, DELAYED RELEASE ORAL DAILY
Qty: 90 CAPSULE | Refills: 1 | Status: SHIPPED | OUTPATIENT
Start: 2025-07-21